# Patient Record
Sex: FEMALE | Race: OTHER | HISPANIC OR LATINO | Employment: FULL TIME | URBAN - METROPOLITAN AREA
[De-identification: names, ages, dates, MRNs, and addresses within clinical notes are randomized per-mention and may not be internally consistent; named-entity substitution may affect disease eponyms.]

---

## 2018-03-09 ENCOUNTER — OFFICE VISIT (OUTPATIENT)
Dept: INTERNAL MEDICINE | Facility: CLINIC | Age: 48
End: 2018-03-09
Payer: COMMERCIAL

## 2018-03-09 ENCOUNTER — APPOINTMENT (OUTPATIENT)
Dept: RADIOLOGY | Facility: HOSPITAL | Age: 48
End: 2018-03-09
Attending: FAMILY MEDICINE
Payer: COMMERCIAL

## 2018-03-09 VITALS
TEMPERATURE: 99 F | WEIGHT: 125.56 LBS | BODY MASS INDEX: 21.55 KG/M2 | OXYGEN SATURATION: 98 % | DIASTOLIC BLOOD PRESSURE: 80 MMHG | HEART RATE: 77 BPM | SYSTOLIC BLOOD PRESSURE: 113 MMHG

## 2018-03-09 DIAGNOSIS — R19.7 DIARRHEA, UNSPECIFIED TYPE: ICD-10-CM

## 2018-03-09 DIAGNOSIS — R10.84 GENERALIZED ABDOMINAL PAIN: Primary | ICD-10-CM

## 2018-03-09 DIAGNOSIS — R11.2 NAUSEA AND VOMITING, INTRACTABILITY OF VOMITING NOT SPECIFIED, UNSPECIFIED VOMITING TYPE: ICD-10-CM

## 2018-03-09 DIAGNOSIS — R31.9 HEMATURIA, UNSPECIFIED TYPE: ICD-10-CM

## 2018-03-09 DIAGNOSIS — R10.84 GENERALIZED ABDOMINAL PAIN: ICD-10-CM

## 2018-03-09 DIAGNOSIS — K92.1 BLOOD IN STOOL: ICD-10-CM

## 2018-03-09 DIAGNOSIS — R63.4 UNINTENTIONAL WEIGHT LOSS: ICD-10-CM

## 2018-03-09 DIAGNOSIS — E87.5 HYPERKALEMIA: ICD-10-CM

## 2018-03-09 LAB
ALBUMIN SERPL BCP-MCNC: 4.5 G/DL
ALP SERPL-CCNC: 44 U/L
ALT SERPL W/O P-5'-P-CCNC: 11 U/L
AMYLASE SERPL-CCNC: 61 U/L
ANION GAP SERPL CALC-SCNC: 8 MMOL/L
AST SERPL-CCNC: 17 U/L
BASOPHILS # BLD AUTO: 0.04 K/UL
BASOPHILS NFR BLD: 0.4 %
BILIRUB SERPL-MCNC: 0.4 MG/DL
BILIRUB SERPL-MCNC: ABNORMAL MG/DL
BILIRUB UR QL STRIP: NEGATIVE
BLOOD URINE, POC: 250
BUN SERPL-MCNC: 13 MG/DL
CALCIUM SERPL-MCNC: 10.4 MG/DL
CHLORIDE SERPL-SCNC: 107 MMOL/L
CLARITY UR REFRACT.AUTO: ABNORMAL
CO2 SERPL-SCNC: 26 MMOL/L
COLOR UR AUTO: YELLOW
COLOR, POC UA: ABNORMAL
CREAT SERPL-MCNC: 1 MG/DL
CRP SERPL-MCNC: <0.1 MG/L
DIFFERENTIAL METHOD: ABNORMAL
EOSINOPHIL # BLD AUTO: 0.1 K/UL
EOSINOPHIL NFR BLD: 0.5 %
ERYTHROCYTE [DISTWIDTH] IN BLOOD BY AUTOMATED COUNT: 13.4 %
EST. GFR  (AFRICAN AMERICAN): >60 ML/MIN/1.73 M^2
EST. GFR  (NON AFRICAN AMERICAN): >60 ML/MIN/1.73 M^2
ESTIMATED AVG GLUCOSE: 94 MG/DL
GLUCOSE SERPL-MCNC: 104 MG/DL
GLUCOSE UR QL STRIP: ABNORMAL
GLUCOSE UR QL STRIP: NEGATIVE
HBA1C MFR BLD HPLC: 4.9 %
HCT VFR BLD AUTO: 40.9 %
HGB BLD-MCNC: 13.3 G/DL
HGB UR QL STRIP: ABNORMAL
IMM GRANULOCYTES # BLD AUTO: 0.04 K/UL
IMM GRANULOCYTES NFR BLD AUTO: 0.4 %
KETONES UR QL STRIP: ABNORMAL
KETONES UR QL STRIP: NEGATIVE
LEUKOCYTE ESTERASE UR QL STRIP: NEGATIVE
LEUKOCYTE ESTERASE URINE, POC: ABNORMAL
LIPASE SERPL-CCNC: 72 U/L
LYMPHOCYTES # BLD AUTO: 2.5 K/UL
LYMPHOCYTES NFR BLD: 22.3 %
MCH RBC QN AUTO: 31.4 PG
MCHC RBC AUTO-ENTMCNC: 32.5 G/DL
MCV RBC AUTO: 97 FL
MICROSCOPIC COMMENT: ABNORMAL
MONOCYTES # BLD AUTO: 0.7 K/UL
MONOCYTES NFR BLD: 6.3 %
NEUTROPHILS # BLD AUTO: 7.9 K/UL
NEUTROPHILS NFR BLD: 70.1 %
NITRITE UR QL STRIP: NEGATIVE
NITRITE, POC UA: ABNORMAL
NRBC BLD-RTO: 0 /100 WBC
PH UR STRIP: 6 [PH] (ref 5–8)
PH, POC UA: 6
PLATELET # BLD AUTO: 297 K/UL
PMV BLD AUTO: 11 FL
POTASSIUM SERPL-SCNC: 5.8 MMOL/L
PROT SERPL-MCNC: 7.5 G/DL
PROT UR QL STRIP: NEGATIVE
PROTEIN, POC: ABNORMAL
RBC # BLD AUTO: 4.23 M/UL
RBC #/AREA URNS AUTO: >100 /HPF (ref 0–4)
SODIUM SERPL-SCNC: 141 MMOL/L
SP GR UR STRIP: 1.02 (ref 1–1.03)
SPECIFIC GRAVITY, POC UA: 1.01
SQUAMOUS #/AREA URNS AUTO: 5 /HPF
URN SPEC COLLECT METH UR: ABNORMAL
UROBILINOGEN UR STRIP-ACNC: 2 EU/DL
UROBILINOGEN, POC UA: ABNORMAL
WBC # BLD AUTO: 11.26 K/UL
WBC #/AREA URNS AUTO: 2 /HPF (ref 0–5)

## 2018-03-09 PROCEDURE — 80053 COMPREHEN METABOLIC PANEL: CPT

## 2018-03-09 PROCEDURE — 99214 OFFICE O/P EST MOD 30 MIN: CPT | Mod: 25,S$GLB,, | Performed by: FAMILY MEDICINE

## 2018-03-09 PROCEDURE — 83690 ASSAY OF LIPASE: CPT

## 2018-03-09 PROCEDURE — 81001 URINALYSIS AUTO W/SCOPE: CPT

## 2018-03-09 PROCEDURE — 86140 C-REACTIVE PROTEIN: CPT

## 2018-03-09 PROCEDURE — 99999 PR PBB SHADOW E&M-EST. PATIENT-LVL III: CPT | Mod: PBBFAC,,, | Performed by: FAMILY MEDICINE

## 2018-03-09 PROCEDURE — 87086 URINE CULTURE/COLONY COUNT: CPT

## 2018-03-09 PROCEDURE — 82150 ASSAY OF AMYLASE: CPT

## 2018-03-09 PROCEDURE — 85025 COMPLETE CBC W/AUTO DIFF WBC: CPT

## 2018-03-09 PROCEDURE — 83036 HEMOGLOBIN GLYCOSYLATED A1C: CPT

## 2018-03-09 PROCEDURE — 81002 URINALYSIS NONAUTO W/O SCOPE: CPT | Mod: S$GLB,,, | Performed by: FAMILY MEDICINE

## 2018-03-09 PROCEDURE — 74019 RADEX ABDOMEN 2 VIEWS: CPT | Mod: TC,FY,PO

## 2018-03-09 PROCEDURE — 74019 RADEX ABDOMEN 2 VIEWS: CPT | Mod: 26,,, | Performed by: RADIOLOGY

## 2018-03-09 RX ORDER — DICYCLOMINE HYDROCHLORIDE 10 MG/1
10 CAPSULE ORAL
Qty: 120 CAPSULE | Refills: 0 | Status: SHIPPED | OUTPATIENT
Start: 2018-03-09 | End: 2018-04-08

## 2018-03-09 NOTE — PROGRESS NOTES
Subjective:       Patient ID: Sabrina Collins is a 47 y.o. female.    Chief Complaint: Diarrhea; Constipation; and pt states pain in abdominal area    HPI  Constipation and diarrhea onset 3 weeks ago. Diarrhea at night, sometimes wakes up at night. Constipation during the day. Has tried immodium, drinking more and less water.  Tried changing food types. Reports mucous and orangey-red blood in stool and diarrhea. Recent weight loss of 10# in 2-3 weeks. Off and on since 20s, previously thought to be IBS but never completed tests. Has tried bentyl in past with improvement.     Emergency appendectomy, 12y ago    Denies recent travel. Continues to smoke    Fever 103, Monday. H CRC. Last cscope was last year with tortuous colon.  Review of Systems   Constitutional: Positive for fever and unexpected weight change.   Gastrointestinal: Positive for abdominal pain, blood in stool, constipation, diarrhea and vomiting.   Genitourinary: Positive for hematuria. Negative for dysuria, frequency and urgency.        Objective:   /80   Pulse 77   Temp 98.7 °F (37.1 °C)   Wt 56.9 kg (125 lb 8.8 oz)   SpO2 98%   BMI 21.55 kg/m²     Physical Exam   Constitutional: She is oriented to person, place, and time. She appears well-nourished. No distress.   HENT:   Head: Normocephalic and atraumatic.   Mouth/Throat: Oropharynx is clear and moist.   Eyes: Conjunctivae and EOM are normal. No scleral icterus.   Neck: Normal range of motion. Neck supple.   Cardiovascular: Normal rate, regular rhythm and normal heart sounds.    Pulmonary/Chest: Effort normal and breath sounds normal. She has no wheezes.   Abdominal: Soft. She exhibits no distension. Bowel sounds are increased. There is tenderness in the right lower quadrant and left lower quadrant. There is no rigidity, no rebound, no guarding and negative Vaz's sign.   Tympanic to percussion on RUQ and RLQ, dullness to percussion in LUQ and LLQ   Musculoskeletal: She exhibits no  edema or deformity.   Neurological: She is alert and oriented to person, place, and time.   Skin: Skin is warm and dry.   Psychiatric: She has a normal mood and affect. Her behavior is normal.   Vitals reviewed.    Assessment:     1. Generalized abdominal pain    2. Nausea and vomiting, intractability of vomiting not specified, unspecified vomiting type    3. Diarrhea, unspecified type    4. Unintentional weight loss    5. Blood in stool      Plan:     Problem List Items Addressed This Visit        Endocrine    Unintentional weight loss    Current Assessment & Plan     Worrisome signs of fever, unintended weight loss, blood in stool, FMH CRC (last cscope one year ago-neg). Will evaluate for infectious cause and electrolyte abnormalities. ED precautions. Concerns for nutrition. Provided FODMAP diet.         Relevant Orders    CT Abdomen With Without Contrast    Hemoglobin A1c       GI    Generalized abdominal pain - Primary    Relevant Medications    dicyclomine (BENTYL) 10 MG capsule    Other Relevant Orders    POCT urine dipstick without microscope (Completed)    Urinalysis    Urine culture    X-Ray Abdomen Flat And Erect    Amylase    Lipase    CT Abdomen With Without Contrast    C-reactive protein    Nausea and vomiting    Relevant Orders    Comprehensive metabolic panel    Diarrhea    Relevant Orders    CBC auto differential    C-reactive protein    Calprotectin    Blood in stool    Relevant Orders    Occult blood x 1, stool          Follow-up in about 3 days (around 3/12/2018), or if symptoms worsen or fail to improve.

## 2018-03-09 NOTE — ASSESSMENT & PLAN NOTE
Worrisome signs of fever, unintended weight loss, blood in stool, FMH CRC (last cscope one year ago-neg). Will evaluate for infectious cause and electrolyte abnormalities. ED precautions. Concerns for nutrition. Provided FODMAP diet.

## 2018-03-09 NOTE — PATIENT INSTRUCTIONS
Irritable Bowel Syndrome    Irritable bowel syndrome (IBS) is a disorder of the intestines. It is not a disease, but a group of symptoms caused by changes in the way the intestines work. It is fairly common, but the cause is not well understood.  Symptoms of IBS include:  · Abdominal pain, discomfort, and cramping  · Diarrhea  · Constipation or dry, hard stools  · Mucous stool  · Bloating  · Feeling of incomplete bowel movements  It usually results in one of 3 patterns of symptoms:  · Chronic abdominal pain and constipation  · Recurring episodes of diarrhea, with or without pain  · Alternating diarrhea and constipation  Home care  The goal of treatment is to control and relieve your symptoms, so you can lead a full and active life. There is no cure for IBS. But it can be managed.  Diet  Your diet did not cause your IBS, but it can affect it. No one diet works for everyone. Finding the best foods for you may take trial and error. Keep a food log to help find what foods made your symptoms worse. Below are some tips that may help you.  · Eat more slowly. Eat smaller amounts at a time, but more often. Remember, you can always eat more, but cannot eat less once youve eaten too much.  · High-fiber foods are complicated. While they may help relieve constipation, they can make your bloating, cramping, gas, and diarrhea worse.  · Eat less sugar.  · Try cutting out dairy products. Sometimes this helps.  · Try cutting out foods that are high in fat and fatty meats.  · You can control bloating or passing excess gas. Be careful with gassy vegetables and fruits like beans, cabbage, broccoli, and cauliflower.  · Be careful with carbonated beverages and fruit juices. They can make your bloating and diarrhea worse.  · Caffeine, alcohol, and stimulants can make symptoms worse. These include coffee, tea, sodas, energy drinks, and chocolate.  Lifestyle  · Look for factors that seem to worsen your symptoms. These include stress and  emotions.   · Although stress does not cause IBS, it may trigger flare-ups. Counseling can help you learn to handle stress. So can self-help measures like exercise, yoga, and meditation.  · Depression can occur along with IBS. Your healthcare provider may prescribe antidepressant medicine. This may help with diarrhea, constipation, and cramping, as well as with symptoms of depression.  · Smoking doesn't cause IBS, but can make the symptoms worse.  Medicines  Your healthcare provider may prescribe medicines. Take them as directed. For acute flare-ups of your illness, your provider may give you prescription medicines.  · Check with your healthcare provider before taking any medicines for diarrhea.  · Avoid anti-inflammatory medicines like ibuprofen or naproxen.  · Consider nutritional supplements. This is especially true if your diarrhea is prolonged, or you aren't eating or are losing weight  Follow-up care  Follow up with your healthcare provider, or as advised. If a stool sample was taken or cultures were done, you will be told if your treatment needs to change. You can call as directed for the results.  When to seek medical advice  Call your healthcare provider right away if any of these occur:  · Abdominal pain gets worse  · Constant abdominal pain moves to the right-lower abdomen  · You can't keep liquids down because of vomiting  · You have severe diarrhea  · You have blood (red or black color) or mucus in your stool  · You feel very weak or dizzy, faint, or have extreme thirst  · You have a fever of 100.4ºF (38.0ºC) or higher, or as directed by your healthcare provider  Date Last Reviewed: 8/31/2015  © 3126-0618 "[employer]. [address] [place] [address], [place] [address]. All rights reserved. This information is not intended as a substitute for professional medical care. Always follow your healthcare professional's instructions.

## 2018-03-11 LAB — BACTERIA UR CULT: NORMAL

## 2018-03-12 ENCOUNTER — TELEPHONE (OUTPATIENT)
Dept: INTERNAL MEDICINE | Facility: CLINIC | Age: 48
End: 2018-03-12

## 2018-03-12 DIAGNOSIS — E87.5 HYPERKALEMIA: Primary | ICD-10-CM

## 2018-03-12 NOTE — TELEPHONE ENCOUNTER
Patient informed of her needing to schedule her appt for her EKG, potassium and stool testing. Pt scheduled her appt for 3/13/18. Pt verbalized understanding of the information given.

## 2018-03-13 ENCOUNTER — TELEPHONE (OUTPATIENT)
Dept: INTERNAL MEDICINE | Facility: CLINIC | Age: 48
End: 2018-03-13

## 2018-03-13 ENCOUNTER — OFFICE VISIT (OUTPATIENT)
Dept: INTERNAL MEDICINE | Facility: CLINIC | Age: 48
End: 2018-03-13
Payer: COMMERCIAL

## 2018-03-13 ENCOUNTER — DOCUMENTATION ONLY (OUTPATIENT)
Dept: INTERNAL MEDICINE | Facility: CLINIC | Age: 48
End: 2018-03-13

## 2018-03-13 VITALS
HEART RATE: 72 BPM | SYSTOLIC BLOOD PRESSURE: 107 MMHG | OXYGEN SATURATION: 100 % | HEIGHT: 64 IN | WEIGHT: 125.88 LBS | BODY MASS INDEX: 21.49 KG/M2 | DIASTOLIC BLOOD PRESSURE: 69 MMHG | TEMPERATURE: 98 F

## 2018-03-13 DIAGNOSIS — R11.2 NAUSEA AND VOMITING, INTRACTABILITY OF VOMITING NOT SPECIFIED, UNSPECIFIED VOMITING TYPE: ICD-10-CM

## 2018-03-13 DIAGNOSIS — R19.7 DIARRHEA, UNSPECIFIED TYPE: ICD-10-CM

## 2018-03-13 DIAGNOSIS — R10.84 GENERALIZED ABDOMINAL PAIN: ICD-10-CM

## 2018-03-13 DIAGNOSIS — E87.5 HYPERKALEMIA: ICD-10-CM

## 2018-03-13 DIAGNOSIS — R74.8 ELEVATED LIPASE: ICD-10-CM

## 2018-03-13 DIAGNOSIS — K92.1 BLOOD IN STOOL: ICD-10-CM

## 2018-03-13 DIAGNOSIS — R31.9 HEMATURIA, UNSPECIFIED TYPE: Primary | ICD-10-CM

## 2018-03-13 LAB
ANION GAP SERPL CALC-SCNC: 10 MMOL/L
BACTERIA #/AREA URNS AUTO: ABNORMAL /HPF
BILIRUB UR QL STRIP: NEGATIVE
BUN SERPL-MCNC: 11 MG/DL
CALCIUM SERPL-MCNC: 9.4 MG/DL
CHLORIDE SERPL-SCNC: 108 MMOL/L
CLARITY UR REFRACT.AUTO: ABNORMAL
CO2 SERPL-SCNC: 22 MMOL/L
COLOR UR AUTO: YELLOW
CREAT SERPL-MCNC: 0.8 MG/DL
EST. GFR  (AFRICAN AMERICAN): >60 ML/MIN/1.73 M^2
EST. GFR  (NON AFRICAN AMERICAN): >60 ML/MIN/1.73 M^2
GLUCOSE SERPL-MCNC: 99 MG/DL
GLUCOSE UR QL STRIP: NEGATIVE
HGB UR QL STRIP: ABNORMAL
KETONES UR QL STRIP: NEGATIVE
LEUKOCYTE ESTERASE UR QL STRIP: NEGATIVE
LIPASE SERPL-CCNC: 30 U/L
MICROSCOPIC COMMENT: ABNORMAL
NITRITE UR QL STRIP: NEGATIVE
OB PNL STL: NEGATIVE
PH UR STRIP: 5 [PH] (ref 5–8)
POTASSIUM SERPL-SCNC: 4.3 MMOL/L
PROT UR QL STRIP: NEGATIVE
RBC #/AREA URNS AUTO: 32 /HPF (ref 0–4)
SODIUM SERPL-SCNC: 140 MMOL/L
SP GR UR STRIP: 1.01 (ref 1–1.03)
SQUAMOUS #/AREA URNS AUTO: 2 /HPF
TSH SERPL DL<=0.005 MIU/L-ACNC: 1.06 UIU/ML
URN SPEC COLLECT METH UR: ABNORMAL
UROBILINOGEN UR STRIP-ACNC: NEGATIVE EU/DL

## 2018-03-13 PROCEDURE — 80048 BASIC METABOLIC PNL TOTAL CA: CPT

## 2018-03-13 PROCEDURE — 83690 ASSAY OF LIPASE: CPT

## 2018-03-13 PROCEDURE — 84443 ASSAY THYROID STIM HORMONE: CPT

## 2018-03-13 PROCEDURE — 99999 PR PBB SHADOW E&M-EST. PATIENT-LVL III: CPT | Mod: PBBFAC,,, | Performed by: FAMILY MEDICINE

## 2018-03-13 PROCEDURE — 83993 ASSAY FOR CALPROTECTIN FECAL: CPT

## 2018-03-13 PROCEDURE — 81001 URINALYSIS AUTO W/SCOPE: CPT

## 2018-03-13 PROCEDURE — 99214 OFFICE O/P EST MOD 30 MIN: CPT | Mod: S$GLB,,, | Performed by: FAMILY MEDICINE

## 2018-03-13 PROCEDURE — 82272 OCCULT BLD FECES 1-3 TESTS: CPT

## 2018-03-13 PROCEDURE — 87086 URINE CULTURE/COLONY COUNT: CPT

## 2018-03-13 NOTE — LETTER
March 13, 2018      [address]  [place] [address]  Phone: [phone]  Fax: [phone]       Patient: Sabrina Collins   YOB: 1970  Date of Visit: 03/13/2018    To Whom It May Concern:    Martha Collins  was at [facility] on 03/13/2018. She may return to work tomorrow 03/14/2018. If you have any questions or concerns, or if I can be of further assistance, please do not hesitate to contact the office.    Sincerely,        BAR Moreira

## 2018-03-13 NOTE — PROGRESS NOTES
Subjective:       Patient ID: Sabrina Collins is a 47 y.o. female.    Chief Complaint: gi upset    Here for recheck - seen 3/9/18 for ab pain, bloody stool. Hx of last c-scope a year ago negative - long hx ab symptoms thought IBS in past. Recent wt loss, subjective fever, N/V/D/C. Reviewed recent labs - lipase mild elevation at 72, bloody urine. Otherwise fairly OK.  Lab Results       Component                Value               Date                       WBC                      11.26               03/09/2018                 HGB                      13.3                03/09/2018                 HCT                      40.9                03/09/2018                 PLT                      297                 03/09/2018                 CHOL                     224 (H)             03/30/2016                 TRIG                     191 (H)             03/30/2016                 HDL                      57                  03/30/2016                 LDLCALC                  128.8               03/30/2016                 ALT                      11                  03/09/2018                 AST                      17                  03/09/2018                 NA                       141                 03/09/2018                 K                        5.8 (H)             03/09/2018                 CL                       107                 03/09/2018                 CALCIUM                  10.4                03/09/2018                 CREATININE               1.0                 03/09/2018                 BUN                      13                  03/09/2018                 CO2                      26                  03/09/2018                 TSH                      1.386               10/28/2014                 GLU                      104                 03/09/2018                 ESTGFRAFRICA             >60.0               03/09/2018                 EGFRNONAA                >60.0                03/09/2018                 HGBA1C                   4.9                 03/09/2018            CT ab/pelvis was ordered but schedule is for early April.  She was given bentyl at her visit which has been helpful in past.    She states she is doing better - no pain today so far. Last diarrhea last evening. Able to sleep well last night. She had eaten rice dish last night.   Sees Dr Villarreal - was not having problems at the time of the colonoscopy last year. It was her second.   She does take probiotics - just one daily.   She will get cramps/diarrhea: but now mucus and blood this past couple months.  reviewd labs with pt. Blood in urine last May with gyn exam Dr De La Cruz. She sts she has persistent pain to mid back - dull achey not stabbing. Constant, including at night.       Review of Systems   Gastrointestinal: Positive for abdominal pain (cramp usually resolved or at minimum reduced with stooling.), blood in stool, constipation, diarrhea and vomiting.   Genitourinary: Positive for frequency, hematuria (discoloration/blood to urine) and urgency. Negative for difficulty urinating, dysuria and flank pain.       Objective:      Physical Exam   Constitutional: She is oriented to person, place, and time. She appears well-developed and well-nourished.   HENT:   Head: Normocephalic and atraumatic.   Right Ear: External ear normal.   Left Ear: External ear normal.   Mouth/Throat: Oropharynx is clear and moist. No oropharyngeal exudate.   Neck: Normal range of motion. Neck supple.   Cardiovascular: Normal rate, regular rhythm and normal heart sounds.    Pulmonary/Chest: Effort normal and breath sounds normal.   Abdominal: Soft. Bowel sounds are normal. She exhibits no distension and no mass. There is tenderness (mild diffuse TTP). There is no rebound and no guarding.   Neurological: She is alert and oriented to person, place, and time.   Skin: Skin is warm and dry.   Psychiatric: She has a normal mood and affect. Her behavior  is normal.         Assessment/Plan:     1. Hematuria, unspecified type  Urinalysis    Urine culture    Ambulatory referral to Urology   2. Hyperkalemia     3. Generalized abdominal pain     4. Nausea and vomiting, intractability of vomiting not specified, unspecified vomiting type     5. Diarrhea, unspecified type  TSH    Clostridium difficile EIA   6. Elevated lipase  Lipase   More than 50% of visit was spent in counseling regarding options, recommendations, medication use, side effects, expectations, and precautions.  Total time spent face-to-face was 25 minutes.  She will appoint with Dr Villarreal.  Needs her CT performed.  recs given re liquid diet and progression.

## 2018-03-13 NOTE — PATIENT INSTRUCTIONS
Continue liquids with introduction of no-fat, non-dairy items as discussed.  Make appt with Dr Villarreal.

## 2018-03-13 NOTE — PROGRESS NOTES
Elevated K w/ no hemolysis and blood in urine. Will need to leigh ann appt w/ her PCP or me for EKG, repeat K, UA, PO4 Tried to order but here today

## 2018-03-14 ENCOUNTER — TELEPHONE (OUTPATIENT)
Dept: INTERNAL MEDICINE | Facility: CLINIC | Age: 48
End: 2018-03-14

## 2018-03-14 DIAGNOSIS — R10.84 GENERALIZED ABDOMINAL PAIN: ICD-10-CM

## 2018-03-14 DIAGNOSIS — R31.9 HEMATURIA, UNSPECIFIED TYPE: Primary | ICD-10-CM

## 2018-03-14 LAB
BACTERIA UR CULT: NORMAL
BACTERIA UR CULT: NORMAL

## 2018-03-14 NOTE — TELEPHONE ENCOUNTER
"----- Message from [employer] sent at 3/13/2018  9:06 PM CDT -----  Regarding: Cancellation of Order # [id number]  Order number [id number] for the procedure CLOSTRIDIUM DIFFICILE   [YXA689] has been canceled by [employer] [[id number]].   This procedure was ordered by Laura Slaughter MD [[id number]] on   Mar 13, 2018 for the patient Sabrina Collins [[id number]]. The   reason for cancellation was "Other".  "

## 2018-03-15 NOTE — TELEPHONE ENCOUNTER
Her CT is not scheduled until 4/4/18. In the meantime, let's get renal US due to hematuria. Please schedule.    Also, can she get CT at earlier date?

## 2018-03-19 ENCOUNTER — TELEPHONE (OUTPATIENT)
Dept: INTERNAL MEDICINE | Facility: CLINIC | Age: 48
End: 2018-03-19

## 2018-03-19 NOTE — TELEPHONE ENCOUNTER
Aurelio from the lab states that the Clostridium Difficle test was unable to be preformed due to the stool sample being formed stool.    Please Advise

## 2018-03-19 NOTE — TELEPHONE ENCOUNTER
----- Message from Aurelio Bentley sent at 3/19/2018  7:43 AM CDT -----  Regarding: Lab Client Services  Contact: [phone]  Hi,           my name is Aurelio I work in the lab. We received a specimen for Clostridium Difficile test. The test was unable to be performed due to the stool sample was formed stool. If you have any questions regarding this please contact client services at [phone]. Thank You

## 2018-03-20 ENCOUNTER — TELEPHONE (OUTPATIENT)
Dept: INTERNAL MEDICINE | Facility: CLINIC | Age: 48
End: 2018-03-20

## 2018-03-21 LAB — CALPROTECTIN STL-MCNT: <15.6 MCG/G

## 2018-03-26 ENCOUNTER — TELEPHONE (OUTPATIENT)
Dept: INTERNAL MEDICINE | Facility: CLINIC | Age: 48
End: 2018-03-26

## 2018-03-26 DIAGNOSIS — R31.9 HEMATURIA, UNSPECIFIED TYPE: ICD-10-CM

## 2018-03-26 DIAGNOSIS — R10.84 GENERALIZED ABDOMINAL PAIN: ICD-10-CM

## 2018-03-26 DIAGNOSIS — R63.4 UNINTENTIONAL WEIGHT LOSS: Primary | ICD-10-CM

## 2018-03-27 NOTE — TELEPHONE ENCOUNTER
CT abdomen was approved, CT pelvis was not approved.  We will order a CT abdomen at this time.  New order entered.  Please schedule etc.

## 2018-04-03 ENCOUNTER — TELEPHONE (OUTPATIENT)
Dept: RADIOLOGY | Facility: HOSPITAL | Age: 48
End: 2018-04-03

## 2018-04-04 ENCOUNTER — HOSPITAL ENCOUNTER (OUTPATIENT)
Dept: RADIOLOGY | Facility: HOSPITAL | Age: 48
Discharge: HOME | End: 2018-04-04
Attending: FAMILY MEDICINE
Payer: COMMERCIAL

## 2018-04-04 DIAGNOSIS — R63.4 UNINTENTIONAL WEIGHT LOSS: ICD-10-CM

## 2018-04-04 DIAGNOSIS — R10.84 GENERALIZED ABDOMINAL PAIN: ICD-10-CM

## 2018-04-04 DIAGNOSIS — R31.9 HEMATURIA, UNSPECIFIED TYPE: ICD-10-CM

## 2018-04-04 PROCEDURE — 74177 CT ABD & PELVIS W/CONTRAST: CPT | Mod: TC,PO

## 2018-04-04 PROCEDURE — 25500020 PHARM REV CODE 255: Mod: PO | Performed by: FAMILY MEDICINE

## 2018-04-04 PROCEDURE — 74177 CT ABD & PELVIS W/CONTRAST: CPT | Mod: 26,,, | Performed by: RADIOLOGY

## 2018-04-04 RX ADMIN — IOHEXOL 30 ML: 350 INJECTION, SOLUTION INTRAVENOUS at 09:04

## 2018-04-04 RX ADMIN — IOHEXOL 75 ML: 350 INJECTION, SOLUTION INTRAVENOUS at 10:04

## 2018-04-12 ENCOUNTER — PATIENT OUTREACH (OUTPATIENT)
Dept: ADMINISTRATIVE | Facility: HOSPITAL | Age: 48
End: 2018-04-12

## 2018-04-20 ENCOUNTER — OFFICE VISIT (OUTPATIENT)
Dept: INTERNAL MEDICINE | Facility: CLINIC | Age: 48
End: 2018-04-20
Payer: COMMERCIAL

## 2018-04-20 VITALS
HEART RATE: 74 BPM | WEIGHT: 128.88 LBS | OXYGEN SATURATION: 100 % | TEMPERATURE: 98 F | BODY MASS INDEX: 22 KG/M2 | SYSTOLIC BLOOD PRESSURE: 136 MMHG | DIASTOLIC BLOOD PRESSURE: 83 MMHG | HEIGHT: 64 IN

## 2018-04-20 DIAGNOSIS — I70.0 ATHEROSCLEROSIS OF ABDOMINAL AORTA: ICD-10-CM

## 2018-04-20 DIAGNOSIS — R10.84 GENERALIZED ABDOMINAL PAIN: Primary | ICD-10-CM

## 2018-04-20 DIAGNOSIS — R31.9 HEMATURIA, UNSPECIFIED TYPE: ICD-10-CM

## 2018-04-20 DIAGNOSIS — F17.200 CURRENT EVERY DAY SMOKER: ICD-10-CM

## 2018-04-20 DIAGNOSIS — Z13.220 SCREENING FOR LIPID DISORDERS: ICD-10-CM

## 2018-04-20 PROCEDURE — 99213 OFFICE O/P EST LOW 20 MIN: CPT | Mod: S$GLB,,, | Performed by: FAMILY MEDICINE

## 2018-04-20 PROCEDURE — 99999 PR PBB SHADOW E&M-EST. PATIENT-LVL III: CPT | Mod: PBBFAC,,, | Performed by: FAMILY MEDICINE

## 2018-04-20 RX ORDER — HYOSCYAMINE SULFATE 0.12 MG/1
0.12 TABLET, ORALLY DISINTEGRATING ORAL EVERY 4 HOURS PRN
COMMUNITY

## 2018-04-20 RX ORDER — DICYCLOMINE HYDROCHLORIDE 10 MG/1
10 CAPSULE ORAL DAILY PRN
COMMUNITY

## 2018-04-20 RX ORDER — PRAVASTATIN SODIUM 40 MG/1
40 TABLET ORAL DAILY
Qty: 90 TABLET | Refills: 1 | Status: SHIPPED | OUTPATIENT
Start: 2018-04-20 | End: 2020-08-03

## 2018-04-20 NOTE — PROGRESS NOTES
Subjective:       Patient ID: Sabrina Collins is a 47 y.o. female.    Chief Complaint: Follow-up (CT scan)    She is here for f/u - reviewed her CT Ab/pelvis results with pt.  She has appointment scheduled with urology.  She sts her diarrhea for 3 months has finally resolved and now she is having problems with constipation which she notes occurs few days prior to menses and lasts through menses.   Upper ab pain on ongoing basis - she sees Dr Villarreal GI and is trying to get appt with him.   She has been gluten free for last month plus and sts this has helped.    Atherosclerosis aorta seen on CT - statin recommendation given.        Review of Systems   Gastrointestinal: Positive for abdominal pain, constipation and diarrhea.       Objective:      Physical Exam   Constitutional: She is oriented to person, place, and time. She appears well-developed and well-nourished.   HENT:   Head: Normocephalic and atraumatic.   Neurological: She is alert and oriented to person, place, and time.   Skin: Skin is warm and dry.   Psychiatric: She has a normal mood and affect. Her behavior is normal.         Assessment/Plan:     1. Generalized abdominal pain     2. Hematuria, unspecified type     3. Atherosclerosis of abdominal aorta  pravastatin (PRAVACHOL) 40 MG tablet   4. Screening for lipid disorders  Lipid panel   5. Current every day smoker  Ambulatory referral to Smoking Cessation Program    new start statin with pravastatin 40 mg.  Check lipids fasting prior to starting pravastatin.  Will place 6 month orders for lipids, AST, ALT when current study complete  She will follow-up with Dr. Villarreal for GI concerns.  She has Nulev and Bentyl for PRN use.  She has changed her diet and that seems to be helping.  Smoking cessation encouraged.  More than 50% of visit was spent in counseling regarding options, recommendations, medication use, side effects, expectations, and precautions.  Total time spent face-to-face was 30 minutes.

## 2018-05-02 ENCOUNTER — OFFICE VISIT (OUTPATIENT)
Dept: UROLOGY | Facility: CLINIC | Age: 48
End: 2018-05-02
Payer: COMMERCIAL

## 2018-05-02 VITALS
WEIGHT: 126.63 LBS | DIASTOLIC BLOOD PRESSURE: 72 MMHG | BODY MASS INDEX: 21.74 KG/M2 | SYSTOLIC BLOOD PRESSURE: 118 MMHG

## 2018-05-02 DIAGNOSIS — R31.9 HEMATURIA, UNSPECIFIED TYPE: Primary | ICD-10-CM

## 2018-05-02 LAB
BILIRUB SERPL-MCNC: NORMAL MG/DL
BLOOD URINE, POC: 250
COLOR, POC UA: YELLOW
GLUCOSE UR QL STRIP: NORMAL
KETONES UR QL STRIP: NORMAL
LEUKOCYTE ESTERASE URINE, POC: NORMAL
NITRITE, POC UA: NORMAL
PH, POC UA: 5
PROTEIN, POC: NORMAL
SPECIFIC GRAVITY, POC UA: 1.01
UROBILINOGEN, POC UA: NORMAL

## 2018-05-02 PROCEDURE — 99244 OFF/OP CNSLTJ NEW/EST MOD 40: CPT | Mod: 25,S$GLB,, | Performed by: UROLOGY

## 2018-05-02 PROCEDURE — 99999 PR PBB SHADOW E&M-EST. PATIENT-LVL II: CPT | Mod: PBBFAC,,, | Performed by: UROLOGY

## 2018-05-02 PROCEDURE — 81002 URINALYSIS NONAUTO W/O SCOPE: CPT | Mod: S$GLB,,, | Performed by: UROLOGY

## 2018-05-02 NOTE — PROGRESS NOTES
Chief Complaint: Hematuria    HPI:   5/2/18: 46 yo woman has periodic gross hematuria and a long history of microhematuria.  No unusual pain to speak of.  Chronic abd/pelvic pain and no exac/rel factors.  No urolithiasis.  No urinary bother.  No  history.  Normal sexual function.  CT w contrast shows no stones or other abnormalities. -UCx recently.  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  Referred by Dr. Slaughter.  Drinks 4 cups coffee a day.    Allergies:  Tetanus vaccines and toxoid    Medications: has a current medication list which includes the following prescription(s): cyclobenzaprine, dicyclomine, hyoscyamine, naproxen, pravastatin, and valacyclovir.    Review of Systems:  General: No fever, chills, fatigability, or weight loss.  Skin: No rashes, itching, or changes in color or texture of skin.  Chest: Denies FRANKLIN, cyanosis, wheezing, cough, and sputum production.  Abdomen: Appetite fine. No weight loss. Denies diarrhea, abdominal pain, hematemesis, or blood in stool.  Musculoskeletal: No joint stiffness or swelling. Denies back pain.  : As above.  All other review of systems negative.    PMH:   has a past medical history of HSV (herpes simplex virus) anogenital infection.    PSH:   has a past surgical history that includes Appendectomy; Tonsillectomy; Adenoidectomy; and Colonoscopy (07/21/2016).    FamHx: family history includes Cancer in her maternal grandfather, maternal grandmother, mother, paternal aunt, and paternal grandmother; Diabetes in her maternal grandmother; Heart disease in her maternal grandmother and paternal grandfather; Heart disease (age of onset: 35) in her mother.    SocHx:  reports that she has been smoking Cigarettes.  She has been smoking about 0.30 packs per day. She has never used smokeless tobacco. She reports that she does not drink alcohol or use drugs.     Physical Exam:  Vitals:   Vitals:    05/02/18 0715   BP: 118/72      General: A&Ox3. No apparent distress. No deformities.  Neck: No masses. Normal thyroid.  Lungs: normal inspiration. No use of accessory muscles.  Heart: normal pulse. No arrhythmias.  Abdomen: Soft. NT. ND. No masses. No hernias. No hepatosplenomegaly.  Lymphatic: Neck and groin nodes negative.  Skin: The skin is warm and dry. No jaundice.  Ext: No c/c/e.  : deferred    Labs/Studies:   Urinalysis performed in clinic, summary: UA normal exc 250 blood    Impression/Plan:   1. Will get CT Urogram and RTC cysto.  2. D/C Levsin runs counter to habits.

## 2018-05-02 NOTE — LETTER
May 2, 2018      Laura Slaughter MD  [address] [place] [address] [address] [place] [id number]           [facility]  [address] [place] [address]  [place] [address]  Phone: [phone]  Fax: [phone]          Patient: Sabrina Collins   MR Number: [id number]   YOB: 1970   Date of Visit: 5/2/2018       Dear Dr. Laura Slaughter:    Thank you for referring Sabrina Collins to me for evaluation. Attached you will find relevant portions of my assessment and plan of care.    If you have questions, please do not hesitate to call me. I look forward to following Sabrina Collins along with you.    Sincerely,    Ben Santos IV, MD    Enclosure  CC:  No Recipients    If you would like to receive this communication electronically, please contact [email or web address] or [phone] to request more information on [employer] access.    For providers and/or their staff who would like to refer a patient to [facility], please contact us through our one-stop-shop provider referral line, [facility], at [phone].    If you feel you have received this communication in error or would no longer like to receive these types of communications, please e-mail [email or web address]

## 2018-06-01 ENCOUNTER — TELEPHONE (OUTPATIENT)
Dept: RADIOLOGY | Facility: HOSPITAL | Age: 48
End: 2018-06-01

## 2018-06-04 ENCOUNTER — HOSPITAL ENCOUNTER (OUTPATIENT)
Dept: RADIOLOGY | Facility: HOSPITAL | Age: 48
Discharge: HOME | End: 2018-06-04
Attending: FAMILY MEDICINE
Payer: COMMERCIAL

## 2018-06-04 DIAGNOSIS — R10.84 GENERALIZED ABDOMINAL PAIN: ICD-10-CM

## 2018-06-04 DIAGNOSIS — R31.9 HEMATURIA, UNSPECIFIED TYPE: ICD-10-CM

## 2018-06-04 PROCEDURE — 76770 US EXAM ABDO BACK WALL COMP: CPT | Mod: 26,,, | Performed by: RADIOLOGY

## 2018-06-04 PROCEDURE — 76770 US EXAM ABDO BACK WALL COMP: CPT | Mod: TC,PO

## 2018-06-06 ENCOUNTER — PATIENT MESSAGE (OUTPATIENT)
Dept: UROLOGY | Facility: CLINIC | Age: 48
End: 2018-06-06

## 2018-06-11 ENCOUNTER — HOSPITAL ENCOUNTER (OUTPATIENT)
Dept: RADIOLOGY | Facility: HOSPITAL | Age: 48
Discharge: HOME | End: 2018-06-11
Attending: UROLOGY
Payer: COMMERCIAL

## 2018-06-11 DIAGNOSIS — R31.9 HEMATURIA, UNSPECIFIED TYPE: ICD-10-CM

## 2018-06-11 PROCEDURE — 25500020 PHARM REV CODE 255: Performed by: UROLOGY

## 2018-06-11 PROCEDURE — 74178 CT ABD&PLV WO CNTR FLWD CNTR: CPT | Mod: TC

## 2018-06-11 RX ADMIN — IOHEXOL 75 ML: 350 INJECTION, SOLUTION INTRAVENOUS at 10:06

## 2018-06-18 ENCOUNTER — OFFICE VISIT (OUTPATIENT)
Dept: UROLOGY | Facility: CLINIC | Age: 48
End: 2018-06-18
Payer: COMMERCIAL

## 2018-06-18 VITALS — WEIGHT: 126.75 LBS | HEIGHT: 68 IN | BODY MASS INDEX: 19.21 KG/M2

## 2018-06-18 DIAGNOSIS — R31.9 HEMATURIA, UNSPECIFIED TYPE: Primary | ICD-10-CM

## 2018-06-18 LAB
BILIRUB SERPL-MCNC: NORMAL MG/DL
BLOOD URINE, POC: 250
COLOR, POC UA: YELLOW
GLUCOSE UR QL STRIP: NORMAL
KETONES UR QL STRIP: NORMAL
LEUKOCYTE ESTERASE URINE, POC: NORMAL
NITRITE, POC UA: NORMAL
PH, POC UA: 7
PROTEIN, POC: NORMAL
SPECIFIC GRAVITY, POC UA: 1.01
UROBILINOGEN, POC UA: NORMAL

## 2018-06-18 PROCEDURE — 81002 URINALYSIS NONAUTO W/O SCOPE: CPT | Mod: S$GLB,,, | Performed by: UROLOGY

## 2018-06-18 PROCEDURE — 99499 UNLISTED E&M SERVICE: CPT | Mod: S$GLB,,, | Performed by: UROLOGY

## 2018-06-18 PROCEDURE — 96372 THER/PROPH/DIAG INJ SC/IM: CPT | Mod: 59,S$GLB,, | Performed by: UROLOGY

## 2018-06-18 PROCEDURE — 52000 CYSTOURETHROSCOPY: CPT | Mod: S$GLB,,, | Performed by: UROLOGY

## 2018-06-18 PROCEDURE — 99999 PR PBB SHADOW E&M-EST. PATIENT-LVL II: CPT | Mod: PBBFAC,,, | Performed by: UROLOGY

## 2018-06-18 RX ORDER — CEFTRIAXONE 1 G/1
1 INJECTION, POWDER, FOR SOLUTION INTRAMUSCULAR; INTRAVENOUS
Status: COMPLETED | OUTPATIENT
Start: 2018-06-18 | End: 2018-06-18

## 2018-06-18 RX ADMIN — CEFTRIAXONE 1 G: 1 INJECTION, POWDER, FOR SOLUTION INTRAMUSCULAR; INTRAVENOUS at 10:06

## 2018-06-18 NOTE — PROGRESS NOTES
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    5/2/18: 46 yo woman has periodic gross hematuria and a long history of microhematuria.  No unusual pain to speak of.  Chronic abd/pelvic pain and no exac/rel factors.  No urolithiasis.  No urinary bother.  No  history.  Normal sexual function.  CT w contrast shows no stones or other abnormalities. -UCx recently.  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  Referred by Dr. Slaughter.  Drinks 4 cups coffee a day.    Allergies:  Tetanus vaccines and toxoid    Medications: has a current medication list which includes the following prescription(s): cyclobenzaprine, dicyclomine, hyoscyamine, naproxen, pravastatin, and valacyclovir.    Review of Systems:  General: No fever, chills, fatigability, or weight loss.  Skin: No rashes, itching, or changes in color or texture of skin.  Chest: Denies FRANKLIN, cyanosis, wheezing, cough, and sputum production.  Abdomen: Appetite fine. No weight loss. Denies diarrhea, abdominal pain, hematemesis, or blood in stool.  Musculoskeletal: No joint stiffness or swelling. Denies back pain.  : As above.  All other review of systems negative.    PMH:   has a past medical history of HSV (herpes simplex virus) anogenital infection.    PSH:   has a past surgical history that includes Appendectomy; Tonsillectomy; Adenoidectomy; and Colonoscopy (07/21/2016).    FamHx: family history includes Cancer in her maternal grandfather, maternal grandmother, mother, paternal aunt, and paternal grandmother; Diabetes in her maternal grandmother; Heart disease in her maternal grandmother and paternal grandfather; Heart disease (age of onset: 35) in her mother.    SocHx:  reports that she has been smoking Cigarettes.  She has been smoking about 0.30 packs per day. She has never used smokeless tobacco. She reports that she does not drink alcohol or use drugs.      Physical Exam:  Vitals:   There were no vitals filed for this visit.  General: A&Ox3. No apparent distress. No deformities.  Neck: No masses. Normal thyroid.  Lungs: normal inspiration. No use of accessory muscles.  Heart: normal pulse. No arrhythmias.  Abdomen: Soft. NT. ND  Skin: The skin is warm and dry. No jaundice.  Ext: No c/c/e.  : deferred    Labs/Studies:   Urinalysis performed in clinic, summary: UA normal exc 250 blood    Procedure: Diagnostic Cystoscopy    Procedure in Detail: After proper consents were obtained, the patient was prepped and draped in normal sterile fashion for diagnostic cystoscopy. 5 ml of lidocaine jelly was instilled in the urethra. The flexible cystoscope was then introduced into the urethra, and advanced into the bladder under direct vision. The urethral mucosa appeared normal, and no strictures were noted. The sphincter appeared to be normal. The bladder neck was normal. Inspection of the interior of the bladder was then carried out. The trigone was unremarkable, with no mucosal lesions. The ureteral orifices were normal in position and configuration. Systematic inspection of the mucosa of the bladder it was then carried out, rotating the cystoscope so that all areas of the left and right lateral walls, the dome of the bladder, and the posterior wall were all visualized. The cystoscope was then advanced further into the bladder, and maximum deflection of the scope was performed so that the bladder neck could be inspected. No mucosal lesions were noted there. The cystoscope was then removed, and the procedure terminated.     Findings: normal cysto    Impression/Plan:   1. US/RTC 1 year.  PFMT.  Rocephin for Abx empiric.

## 2018-06-18 NOTE — PROGRESS NOTES
Per Dr. Santos IM rocephin given right ventrolateral using aseptic technique. Pt tolerated well. Pt instructed to remain in clinic for 15 minutes after injection to monitor for s/s of infection.

## 2019-01-04 ENCOUNTER — OFFICE VISIT (OUTPATIENT)
Dept: INTERNAL MEDICINE | Facility: CLINIC | Age: 49
End: 2019-01-04
Payer: COMMERCIAL

## 2019-01-04 VITALS
OXYGEN SATURATION: 99 % | WEIGHT: 131.06 LBS | SYSTOLIC BLOOD PRESSURE: 129 MMHG | DIASTOLIC BLOOD PRESSURE: 85 MMHG | HEART RATE: 64 BPM | HEIGHT: 68 IN | TEMPERATURE: 98 F | BODY MASS INDEX: 19.86 KG/M2

## 2019-01-04 DIAGNOSIS — F43.0 ANXIETY IN ACUTE STRESS REACTION: Primary | ICD-10-CM

## 2019-01-04 DIAGNOSIS — F41.1 ANXIETY IN ACUTE STRESS REACTION: Primary | ICD-10-CM

## 2019-01-04 DIAGNOSIS — M54.2 NECK PAIN, BILATERAL POSTERIOR: ICD-10-CM

## 2019-01-04 DIAGNOSIS — Z23 FLU VACCINE NEED: ICD-10-CM

## 2019-01-04 PROCEDURE — 99999 PR PBB SHADOW E&M-EST. PATIENT-LVL IV: CPT | Mod: PBBFAC,,, | Performed by: FAMILY MEDICINE

## 2019-01-04 PROCEDURE — 3008F PR BODY MASS INDEX (BMI) DOCUMENTED: ICD-10-PCS | Mod: CPTII,S$GLB,, | Performed by: FAMILY MEDICINE

## 2019-01-04 PROCEDURE — 99214 OFFICE O/P EST MOD 30 MIN: CPT | Mod: 25,S$GLB,, | Performed by: FAMILY MEDICINE

## 2019-01-04 PROCEDURE — 90686 FLU VACCINE (QUAD) GREATER THAN OR EQUAL TO 3YO PRESERVATIVE FREE IM: ICD-10-PCS | Mod: S$GLB,,, | Performed by: FAMILY MEDICINE

## 2019-01-04 PROCEDURE — 90471 FLU VACCINE (QUAD) GREATER THAN OR EQUAL TO 3YO PRESERVATIVE FREE IM: ICD-10-PCS | Mod: S$GLB,,, | Performed by: FAMILY MEDICINE

## 2019-01-04 PROCEDURE — 90471 IMMUNIZATION ADMIN: CPT | Mod: S$GLB,,, | Performed by: FAMILY MEDICINE

## 2019-01-04 PROCEDURE — 99999 PR PBB SHADOW E&M-EST. PATIENT-LVL IV: ICD-10-PCS | Mod: PBBFAC,,, | Performed by: FAMILY MEDICINE

## 2019-01-04 PROCEDURE — 90686 IIV4 VACC NO PRSV 0.5 ML IM: CPT | Mod: S$GLB,,, | Performed by: FAMILY MEDICINE

## 2019-01-04 PROCEDURE — 3008F BODY MASS INDEX DOCD: CPT | Mod: CPTII,S$GLB,, | Performed by: FAMILY MEDICINE

## 2019-01-04 PROCEDURE — 99214 PR OFFICE/OUTPT VISIT, EST, LEVL IV, 30-39 MIN: ICD-10-PCS | Mod: 25,S$GLB,, | Performed by: FAMILY MEDICINE

## 2019-01-04 RX ORDER — CYCLOBENZAPRINE HCL 10 MG
10 TABLET ORAL 3 TIMES DAILY PRN
Qty: 20 TABLET | Refills: 1 | Status: SHIPPED | OUTPATIENT
Start: 2019-01-04 | End: 2019-03-27 | Stop reason: SDUPTHER

## 2019-01-04 RX ORDER — ESCITALOPRAM OXALATE 10 MG/1
10 TABLET ORAL DAILY
Qty: 30 TABLET | Refills: 2 | Status: SHIPPED | OUTPATIENT
Start: 2019-01-04 | End: 2019-04-22 | Stop reason: SDUPTHER

## 2019-01-04 NOTE — PROGRESS NOTES
"Subjective:       Patient ID: Sabrina Collins is a 48 y.o. female.    Chief Complaint: Anxiety    She I s here with concerns about severe stress. Thinking her abdominal issues are related to anxiety, blistering/exzema as well. For the last year she is shaky, nauseated on awakening, sometimes bursts into tears for "no reason". Yesterday found herself delaying going in to work by washing her car in the rain. She in the past has normally been good to go. 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 No increased pay.    She did contact [facility] before Xmas holiday - saw two counselors and has couple more visits available.  Found when dealing with father's health crises over the holidays that although it was stressful she had none of the nausea, abdominal pain, tearfulness. Then as soon as she has RTW this week these all returned. She does have financial pressures, loves her profession, but not current situation. Finds current stressors intolerable. No SI.  Hx of depression and anxiety as teen - on SSRIs from age 12 through college and into first job. Recalls that prozac did not work well for her. One made her violent and aggressive - not know which.   Menses skipped three months this fall - sept - oct - nov. Had menses Dec.       Review of Systems   Constitutional: Positive for fatigue. Negative for appetite change.   Gastrointestinal: Positive for abdominal pain, diarrhea, nausea (smoothies qAM stay down ) and vomiting.   Musculoskeletal: Positive for back pain, myalgias and neck pain.   Neurological: Positive for headaches (occipital/tension HAs almost daily - uses stretches water naproxen).   Psychiatric/Behavioral: Positive for dysphoric mood and sleep disturbance (not sleeping onset and early awakening). The patient is nervous/anxious.        Objective:    "   Physical Exam   Constitutional: She is oriented to person, place, and time. She appears well-developed.   HENT:   Head: Normocephalic and atraumatic.   Cardiovascular: Normal rate, regular rhythm and normal heart sounds.   Pulmonary/Chest: Effort normal and breath sounds normal.   Neurological: She is alert and oriented to person, place, and time.   Skin: Skin is warm and dry.   Psychiatric: She has a normal mood and affect. Her behavior is normal.         Assessment/Plan:     1. Anxiety in acute stress reaction  escitalopram oxalate (LEXAPRO) 10 MG tablet    Ambulatory referral to Psychiatry   2. Neck pain, bilateral posterior  cyclobenzaprine (FLEXERIL) 10 MG tablet   3. Flu vaccine need  Influenza - Quadrivalent (3 years & older) (PF)   More than 50% of visit was spent in counseling regarding options, recommendations, medication use, side effects, expectations, and precautions.  Total time spent face-to-face was 35 minutes.  New start lexapro and recheck 5 weeks - see counselors for help with tools/choices

## 2019-01-04 NOTE — LETTER
January 4, 2019      [address]  [place] [address]  Phone: [phone]  Fax: [phone]       Patient: Sabrina Collins   YOB: 1970  Date of Visit: 01/04/2019    To Whom It May Concern:    Martha Collins  was at [facility] on 01/04/2019. She may return to work/school on 01/07/2019 with no restrictions. If you have any questions or concerns, or if I can be of further assistance, please do not hesitate to contact me.    Sincerely,    CRISTOFER Bourne LPN

## 2019-01-04 NOTE — PATIENT INSTRUCTIONS
Call the [facility] at [phone] for your appt with a counselor    Investigate other counselors and I will make another referral if needed.

## 2019-02-08 ENCOUNTER — OFFICE VISIT (OUTPATIENT)
Dept: INTERNAL MEDICINE | Facility: CLINIC | Age: 49
End: 2019-02-08
Payer: COMMERCIAL

## 2019-02-08 VITALS
HEART RATE: 73 BPM | SYSTOLIC BLOOD PRESSURE: 123 MMHG | BODY MASS INDEX: 19.25 KG/M2 | HEIGHT: 68 IN | TEMPERATURE: 97 F | OXYGEN SATURATION: 99 % | WEIGHT: 127 LBS | DIASTOLIC BLOOD PRESSURE: 86 MMHG

## 2019-02-08 DIAGNOSIS — F41.1 ANXIETY IN ACUTE STRESS REACTION: Primary | ICD-10-CM

## 2019-02-08 DIAGNOSIS — F43.0 ANXIETY IN ACUTE STRESS REACTION: Primary | ICD-10-CM

## 2019-02-08 DIAGNOSIS — L98.9 SKIN LESION: ICD-10-CM

## 2019-02-08 PROCEDURE — 3008F PR BODY MASS INDEX (BMI) DOCUMENTED: ICD-10-PCS | Mod: CPTII,S$GLB,, | Performed by: FAMILY MEDICINE

## 2019-02-08 PROCEDURE — 99213 OFFICE O/P EST LOW 20 MIN: CPT | Mod: S$GLB,,, | Performed by: FAMILY MEDICINE

## 2019-02-08 PROCEDURE — 3008F BODY MASS INDEX DOCD: CPT | Mod: CPTII,S$GLB,, | Performed by: FAMILY MEDICINE

## 2019-02-08 PROCEDURE — 99999 PR PBB SHADOW E&M-EST. PATIENT-LVL III: ICD-10-PCS | Mod: PBBFAC,,, | Performed by: FAMILY MEDICINE

## 2019-02-08 PROCEDURE — 99999 PR PBB SHADOW E&M-EST. PATIENT-LVL III: CPT | Mod: PBBFAC,,, | Performed by: FAMILY MEDICINE

## 2019-02-08 PROCEDURE — 99213 PR OFFICE/OUTPT VISIT, EST, LEVL III, 20-29 MIN: ICD-10-PCS | Mod: S$GLB,,, | Performed by: FAMILY MEDICINE

## 2019-02-08 NOTE — PROGRESS NOTES
Subjective:       Patient ID: Sabrina Collins is a 48 y.o. female.    Chief Complaint: Follow-up    This is a scheduled recheck for new start on Lexapro 10 mg 5 weeks ago.  She is experiencing stressful work situation leading to physical symptoms nausea/abdominal pain/skin rashes. She feels she has really benefited from the med. She still has heavy workload and not enough sleep. 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 Her neck pain is resolved now - she is still apt to have nausea and ab pain - not as severe or persistent - not having to get up at night to have diarrhea.      Review of Systems   Constitutional: Negative for appetite change.   Respiratory: Negative for shortness of breath.    Cardiovascular: Negative for chest pain and palpitations.   Gastrointestinal: Negative for constipation.   Musculoskeletal: Positive for arthralgias (shoulders, knees) and neck pain (still constant but decreased severity and increased mobility). Negative for back pain.   Psychiatric/Behavioral: Positive for sleep disturbance (sleep is improved. she still has difficulty falling asleep.). Decreased concentration: improved on med, but memory may not be quite perfect - does not feel significant problem.       Objective:      Physical Exam   Constitutional: She is oriented to person, place, and time. She appears well-developed.   HENT:   Head: Normocephalic and atraumatic.   Cardiovascular: Normal rate, regular rhythm and normal heart sounds.   Pulmonary/Chest: Effort normal and breath sounds normal.   Neurological: She is alert and oriented to person, place, and time.   Skin: Skin is warm and dry.   Psychiatric: She has a normal mood and affect. Her behavior is normal.         Assessment/Plan:     1. Anxiety in acute stress reaction     2. Skin lesion  Ambulatory referral to Dermatology   will continue on 10 mg lexapro. Maintain current dose level as it is effective. Re  counseling - never got a call back from [facility] counseling service, so has not had any counseling since last visit.  Will refill when needed - next recheck after school is out. PRN recheck as well.  She also has a skin lesion she wishes to have removed - it gets caught - she is advised to see derm.   More than 50% of visit was spent in counseling regarding options, recommendations, medication use, side effects, expectations, and precautions.  Total time spent face-to-face was 20 minutes.

## 2019-03-27 DIAGNOSIS — M54.2 NECK PAIN, BILATERAL POSTERIOR: ICD-10-CM

## 2019-03-27 RX ORDER — CYCLOBENZAPRINE HCL 10 MG
TABLET ORAL
Qty: 20 TABLET | Refills: 0 | Status: SHIPPED | OUTPATIENT
Start: 2019-03-27 | End: 2020-08-03

## 2019-04-22 DIAGNOSIS — F41.1 ANXIETY IN ACUTE STRESS REACTION: ICD-10-CM

## 2019-04-22 DIAGNOSIS — F43.0 ANXIETY IN ACUTE STRESS REACTION: ICD-10-CM

## 2019-04-22 RX ORDER — ESCITALOPRAM OXALATE 10 MG/1
TABLET ORAL
Qty: 30 TABLET | Refills: 5 | Status: SHIPPED | OUTPATIENT
Start: 2019-04-22 | End: 2020-08-03

## 2019-06-17 ENCOUNTER — TELEPHONE (OUTPATIENT)
Dept: RADIOLOGY | Facility: HOSPITAL | Age: 49
End: 2019-06-17

## 2019-06-18 ENCOUNTER — HOSPITAL ENCOUNTER (OUTPATIENT)
Dept: RADIOLOGY | Facility: HOSPITAL | Age: 49
Discharge: HOME | End: 2019-06-18
Attending: UROLOGY
Payer: COMMERCIAL

## 2019-06-18 DIAGNOSIS — R31.9 HEMATURIA, UNSPECIFIED TYPE: ICD-10-CM

## 2019-06-18 PROCEDURE — 76770 US EXAM ABDO BACK WALL COMP: CPT | Mod: 26,,, | Performed by: RADIOLOGY

## 2019-06-18 PROCEDURE — 76770 US RETROPERITONEAL COMPLETE: ICD-10-PCS | Mod: 26,,, | Performed by: RADIOLOGY

## 2019-06-18 PROCEDURE — 76770 US EXAM ABDO BACK WALL COMP: CPT | Mod: TC

## 2019-06-26 ENCOUNTER — OFFICE VISIT (OUTPATIENT)
Dept: UROLOGY | Facility: CLINIC | Age: 49
End: 2019-06-26
Payer: COMMERCIAL

## 2019-06-26 VITALS
SYSTOLIC BLOOD PRESSURE: 118 MMHG | DIASTOLIC BLOOD PRESSURE: 82 MMHG | WEIGHT: 127 LBS | HEART RATE: 74 BPM | HEIGHT: 68 IN | BODY MASS INDEX: 19.25 KG/M2

## 2019-06-26 DIAGNOSIS — R31.9 HEMATURIA, UNSPECIFIED TYPE: Primary | ICD-10-CM

## 2019-06-26 DIAGNOSIS — N32.81 OAB (OVERACTIVE BLADDER): ICD-10-CM

## 2019-06-26 LAB
BILIRUB SERPL-MCNC: NORMAL MG/DL
BLOOD URINE, POC: 250
COLOR, POC UA: YELLOW
GLUCOSE UR QL STRIP: NORMAL
KETONES UR QL STRIP: NORMAL
LEUKOCYTE ESTERASE URINE, POC: NORMAL
NITRITE, POC UA: NORMAL
PH, POC UA: 8
PROTEIN, POC: NORMAL
SPECIFIC GRAVITY, POC UA: 1
UROBILINOGEN, POC UA: NORMAL

## 2019-06-26 PROCEDURE — 99214 OFFICE O/P EST MOD 30 MIN: CPT | Mod: 25,S$GLB,, | Performed by: UROLOGY

## 2019-06-26 PROCEDURE — 99999 PR PBB SHADOW E&M-EST. PATIENT-LVL III: ICD-10-PCS | Mod: PBBFAC,,, | Performed by: UROLOGY

## 2019-06-26 PROCEDURE — 99999 PR PBB SHADOW E&M-EST. PATIENT-LVL III: CPT | Mod: PBBFAC,,, | Performed by: UROLOGY

## 2019-06-26 PROCEDURE — 99214 PR OFFICE/OUTPT VISIT, EST, LEVL IV, 30-39 MIN: ICD-10-PCS | Mod: 25,S$GLB,, | Performed by: UROLOGY

## 2019-06-26 PROCEDURE — 81002 POCT URINE DIPSTICK WITHOUT MICROSCOPE: ICD-10-PCS | Mod: S$GLB,,, | Performed by: UROLOGY

## 2019-06-26 PROCEDURE — 3008F PR BODY MASS INDEX (BMI) DOCUMENTED: ICD-10-PCS | Mod: CPTII,S$GLB,, | Performed by: UROLOGY

## 2019-06-26 PROCEDURE — 81002 URINALYSIS NONAUTO W/O SCOPE: CPT | Mod: S$GLB,,, | Performed by: UROLOGY

## 2019-06-26 PROCEDURE — 3008F BODY MASS INDEX DOCD: CPT | Mod: CPTII,S$GLB,, | Performed by: UROLOGY

## 2019-06-26 RX ORDER — CLOBETASOL PROPIONATE 0.5 MG/G
CREAM TOPICAL 2 TIMES DAILY
COMMUNITY

## 2019-06-26 RX ORDER — MUPIROCIN 20 MG/G
OINTMENT TOPICAL 3 TIMES DAILY
COMMUNITY
End: 2020-08-03 | Stop reason: SDUPTHER

## 2019-06-26 NOTE — PROGRESS NOTES
Chief Complaint: Hematuria    HPI:   6/26/19: Didn't get in for PFMT.  US reassuring no problems.  Caffeine reduction has helped symptoms some. Discussed indications of continued surveillance.  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  Cysto normal.  5/2/18: 48 yo woman has periodic gross hematuria and a long history of microhematuria.  No unusual pain to speak of.  Chronic abd/pelvic pain and no exac/rel factors.  No urolithiasis.  No urinary bother.  No  history.  Normal sexual function.  CT w contrast shows no stones or other abnormalities. -UCx recently.  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  Referred by Dr. Slaughter.  Drinks 4 cups coffee a day.    Allergies:  Tetanus vaccines and toxoid    Medications: has a current medication list which includes the following prescription(s): clobetasol, cyclobenzaprine, dicyclomine, escitalopram oxalate, hyoscyamine, mupirocin, naproxen, pravastatin, and valacyclovir.    Review of Systems:  General: No fever, chills, fatigability, or weight loss.  Skin: No rashes, itching, or changes in color or texture of skin.  Chest: Denies FRANKLIN, cyanosis, wheezing, cough, and sputum production.  Abdomen: Appetite fine. No weight loss. Denies diarrhea, abdominal pain, hematemesis, or blood in stool.  Musculoskeletal: No joint stiffness or swelling. Denies back pain.  : As above.  All other review of systems negative.    PMH:   has a past medical history of HSV (herpes simplex virus) anogenital infection.    PSH:   has a past surgical history that includes Appendectomy; Tonsillectomy; Adenoidectomy; and Colonoscopy (07/21/2016).    FamHx: family history includes Cancer in her maternal grandfather, maternal grandmother, mother, paternal aunt, and paternal grandmother; Diabetes in her maternal grandmother; Heart disease in her maternal grandmother and paternal grandfather; Heart disease (age of onset: 35) in her  mother.    SocHx:  reports that she has been smoking cigarettes.  She has been smoking about 0.30 packs per day. She has never used smokeless tobacco. She reports that she does not drink alcohol or use drugs.     Physical Exam:  Vitals:   Vitals:    06/26/19 1123   BP: 118/82   Pulse: 74     General: A&Ox3. No apparent distress. No deformities.  Neck: No masses. Normal thyroid.  Lungs: normal inspiration. No use of accessory muscles.  Heart: normal pulse. No arrhythmias.  Abdomen: Soft. NT. ND  Skin: The skin is warm and dry. No jaundice.  Ext: No c/c/e.  : deferred    Labs/Studies:   Urinalysis performed in clinic, summary: UA normal exc 250 blood    Impression/Plan:   1. US/RTC 1 year.  PFMT still desired.

## 2019-07-17 LAB
ALBUMIN/GLOB SERPL ELPH: 2.1 {RATIO}
ALBUMIN: 4.8
ALP ISOS SERPL LEV INH-CCNC: 53 U/L
ALT SERPL-CCNC: 15 U/L
AST SERPL-CCNC: 20 U/L
BILIRUBIN TOTAL: 0.4
BUN BLD-MCNC: 12 MG/DL (ref 4–21)
BUN/CREAT SERPL: 15
CALCIUM SERPL-MCNC: 9.7 MG/DL
CHLORIDE: 104
CHOLESTEROL, TOTAL: 266
CO2 SERPL-SCNC: 24 MMOL/L
CREATININE: 0.81
EGFR IF AFRICAN AMERICAN: 98
EST. GFR  (NON AFRICAN AMERICAN): 85 MG/DL
GLOBULIN SER CALC-MCNC: 2.3 G/L
GLUCOSE: 98
HDLC SERPL-MCNC: 79 MG/DL
LDLC SERPL CALC-MCNC: 162 MG/DL (ref 0–160)
POTASSIUM: 4.5
PROTEIN TOTAL: 7.1
SODIUM: 143
TRIGL SERPL-MCNC: 124 MG/DL
VLDLC SERPL-MCNC: 25 MG/DL

## 2019-07-30 ENCOUNTER — PATIENT OUTREACH (OUTPATIENT)
Dept: ADMINISTRATIVE | Facility: HOSPITAL | Age: 49
End: 2019-07-30

## 2019-08-07 ENCOUNTER — PATIENT OUTREACH (OUTPATIENT)
Dept: ADMINISTRATIVE | Facility: HOSPITAL | Age: 49
End: 2019-08-07

## 2019-08-08 NOTE — TELEPHONE ENCOUNTER
cT Scan of the Abdomen has been approved certification number is [id number]  The approval is valid March 9, 2018 to April 7, 2018  Please advise    Yes

## 2020-07-22 ENCOUNTER — PATIENT OUTREACH (OUTPATIENT)
Dept: ADMINISTRATIVE | Facility: OTHER | Age: 50
End: 2020-07-22

## 2020-07-22 DIAGNOSIS — Z12.31 BREAST CANCER SCREENING BY MAMMOGRAM: Primary | ICD-10-CM

## 2020-07-23 ENCOUNTER — OFFICE VISIT (OUTPATIENT)
Dept: UROLOGY | Facility: CLINIC | Age: 50
End: 2020-07-23
Payer: COMMERCIAL

## 2020-07-23 VITALS
BODY MASS INDEX: 23.6 KG/M2 | SYSTOLIC BLOOD PRESSURE: 122 MMHG | DIASTOLIC BLOOD PRESSURE: 80 MMHG | TEMPERATURE: 98 F | WEIGHT: 155.19 LBS

## 2020-07-23 DIAGNOSIS — R31.9 HEMATURIA, UNSPECIFIED TYPE: Primary | ICD-10-CM

## 2020-07-23 LAB
BILIRUB SERPL-MCNC: ABNORMAL MG/DL
BLOOD URINE, POC: ABNORMAL
CLARITY, POC UA: CLEAR
COLOR, POC UA: YELLOW
GLUCOSE UR QL STRIP: ABNORMAL
KETONES UR QL STRIP: ABNORMAL
LEUKOCYTE ESTERASE URINE, POC: ABNORMAL
NITRITE, POC UA: ABNORMAL
PH, POC UA: 7
PROTEIN, POC: ABNORMAL
SPECIFIC GRAVITY, POC UA: 1
UROBILINOGEN, POC UA: ABNORMAL

## 2020-07-23 PROCEDURE — 81002 URINALYSIS NONAUTO W/O SCOPE: CPT | Mod: S$GLB,,, | Performed by: UROLOGY

## 2020-07-23 PROCEDURE — 99999 PR PBB SHADOW E&M-EST. PATIENT-LVL III: ICD-10-PCS | Mod: PBBFAC,,, | Performed by: UROLOGY

## 2020-07-23 PROCEDURE — 3008F BODY MASS INDEX DOCD: CPT | Mod: CPTII,S$GLB,, | Performed by: UROLOGY

## 2020-07-23 PROCEDURE — 99999 PR PBB SHADOW E&M-EST. PATIENT-LVL III: CPT | Mod: PBBFAC,,, | Performed by: UROLOGY

## 2020-07-23 PROCEDURE — 3008F PR BODY MASS INDEX (BMI) DOCUMENTED: ICD-10-PCS | Mod: CPTII,S$GLB,, | Performed by: UROLOGY

## 2020-07-23 PROCEDURE — 99213 OFFICE O/P EST LOW 20 MIN: CPT | Mod: 25,S$GLB,, | Performed by: UROLOGY

## 2020-07-23 PROCEDURE — 81002 POCT URINE DIPSTICK WITHOUT MICROSCOPE: ICD-10-PCS | Mod: S$GLB,,, | Performed by: UROLOGY

## 2020-07-23 PROCEDURE — 99213 PR OFFICE/OUTPT VISIT, EST, LEVL III, 20-29 MIN: ICD-10-PCS | Mod: 25,S$GLB,, | Performed by: UROLOGY

## 2020-07-23 NOTE — PROGRESS NOTES
Chief Complaint: Hematuria    HPI:   7/23/20: Started the PFMT but insurance didn't cover.  Seemed to help some.  US not done yet.  No interval problems.  Double voiding seems to work.   6/26/19: Didn't get in for PFMT.  US reassuring no problems.  Caffeine reduction has helped symptoms some. Discussed indications of continued surveillance.  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  Cysto normal.  5/2/18: 48 yo woman has periodic gross hematuria and a long history of microhematuria.  No unusual pain to speak of.  Chronic abd/pelvic pain and no exac/rel factors.  No urolithiasis.  No urinary bother.  No  history.  Normal sexual function.  CT w contrast shows no stones or other abnormalities. -UCx recently.  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  Referred by Dr. Slaughter.  Drinks 4 cups coffee a day.    Allergies:  Tetanus vaccines and toxoid    Medications: has a current medication list which includes the following prescription(s): clobetasol, cyclobenzaprine, dicyclomine, hyoscyamine, mupirocin, naproxen, valacyclovir, escitalopram oxalate, and pravastatin.    Review of Systems:  General: No fever, chills, fatigability, or weight loss.  Skin: No rashes, itching, or changes in color or texture of skin.  Chest: Denies FRANKLIN, cyanosis, wheezing, cough, and sputum production.  Abdomen: Appetite fine. No weight loss. Denies diarrhea, abdominal pain, hematemesis, or blood in stool.  Musculoskeletal: No joint stiffness or swelling. Denies back pain.  : As above.  All other review of systems negative.    PMH:   has a past medical history of HSV (herpes simplex virus) anogenital infection.    PSH:   has a past surgical history that includes Appendectomy; Tonsillectomy; Adenoidectomy; and Colonoscopy (07/21/2016).    FamHx: family history includes Cancer in her maternal grandfather, maternal grandmother, mother, paternal aunt, and paternal grandmother;  Diabetes in her maternal grandmother; Heart disease in her maternal grandmother and paternal grandfather; Heart disease (age of onset: 35) in her mother.    SocHx:  reports that she has been smoking cigarettes. She has been smoking about 0.30 packs per day. She has never used smokeless tobacco. She reports that she does not drink alcohol or use drugs.     Physical Exam:  Vitals:   Vitals:    07/23/20 1600   BP: 122/80   Temp: 98.2 °F (36.8 °C)     General: A&Ox3. No apparent distress. No deformities.  Neck: No masses. Normal thyroid.  Lungs: normal inspiration. No use of accessory muscles.  Heart: normal pulse. No arrhythmias.  Abdomen: Soft. NT. ND  Skin: The skin is warm and dry. No jaundice.  Ext: No c/c/e.  : deferred    Labs/Studies:   Urinalysis performed in clinic, summary: UA normal exc 250 blood    Impression/Plan:   1. US now and US/RTC 1 year.  PFMT still desired.

## 2020-07-23 NOTE — PROGRESS NOTES
Chart reviewed.   Immunizations: Triggered Imm Registry     Orders placed: mammo w/rosy  Upcoming appts to satisfy SUSSY topics: n/a

## 2020-07-28 ENCOUNTER — PATIENT OUTREACH (OUTPATIENT)
Dept: ADMINISTRATIVE | Facility: HOSPITAL | Age: 50
End: 2020-07-28

## 2020-07-29 ENCOUNTER — TELEPHONE (OUTPATIENT)
Dept: RADIOLOGY | Facility: HOSPITAL | Age: 50
End: 2020-07-29

## 2020-07-30 ENCOUNTER — HOSPITAL ENCOUNTER (OUTPATIENT)
Dept: RADIOLOGY | Facility: HOSPITAL | Age: 50
Discharge: HOME | End: 2020-07-30
Attending: UROLOGY
Payer: COMMERCIAL

## 2020-07-30 DIAGNOSIS — R31.9 HEMATURIA, UNSPECIFIED TYPE: ICD-10-CM

## 2020-07-30 PROCEDURE — 76770 US EXAM ABDO BACK WALL COMP: CPT | Mod: TC

## 2020-07-30 PROCEDURE — 76770 US RETROPERITONEAL COMPLETE: ICD-10-PCS | Mod: 26,,, | Performed by: RADIOLOGY

## 2020-07-30 PROCEDURE — 76770 US EXAM ABDO BACK WALL COMP: CPT | Mod: 26,,, | Performed by: RADIOLOGY

## 2020-08-03 ENCOUNTER — OFFICE VISIT (OUTPATIENT)
Dept: INTERNAL MEDICINE | Facility: CLINIC | Age: 50
End: 2020-08-03
Payer: COMMERCIAL

## 2020-08-03 ENCOUNTER — HOSPITAL ENCOUNTER (OUTPATIENT)
Dept: RADIOLOGY | Facility: HOSPITAL | Age: 50
Discharge: HOME | End: 2020-08-03
Attending: FAMILY MEDICINE
Payer: COMMERCIAL

## 2020-08-03 VITALS
WEIGHT: 155.19 LBS | BODY MASS INDEX: 23.52 KG/M2 | DIASTOLIC BLOOD PRESSURE: 76 MMHG | TEMPERATURE: 99 F | OXYGEN SATURATION: 97 % | SYSTOLIC BLOOD PRESSURE: 124 MMHG | HEIGHT: 68 IN | HEART RATE: 88 BPM

## 2020-08-03 DIAGNOSIS — R07.9 CHEST PAIN, UNSPECIFIED TYPE: ICD-10-CM

## 2020-08-03 DIAGNOSIS — L98.9 SKIN LESION: ICD-10-CM

## 2020-08-03 DIAGNOSIS — Z23 IMMUNIZATION DUE: ICD-10-CM

## 2020-08-03 DIAGNOSIS — Z11.59 NEED FOR HEPATITIS C SCREENING TEST: ICD-10-CM

## 2020-08-03 DIAGNOSIS — A60.9 HSV (HERPES SIMPLEX VIRUS) ANOGENITAL INFECTION: ICD-10-CM

## 2020-08-03 DIAGNOSIS — J45.20 MILD INTERMITTENT ASTHMA WITHOUT COMPLICATION: ICD-10-CM

## 2020-08-03 DIAGNOSIS — Z00.00 WELLNESS EXAMINATION: Primary | ICD-10-CM

## 2020-08-03 DIAGNOSIS — Z11.4 SCREENING FOR HIV WITHOUT PRESENCE OF RISK FACTORS: ICD-10-CM

## 2020-08-03 PROCEDURE — 99999 PR PBB SHADOW E&M-EST. PATIENT-LVL IV: ICD-10-PCS | Mod: PBBFAC,,, | Performed by: FAMILY MEDICINE

## 2020-08-03 PROCEDURE — 90471 IMMUNIZATION ADMIN: CPT | Mod: S$GLB,,, | Performed by: FAMILY MEDICINE

## 2020-08-03 PROCEDURE — 90471 PNEUMOCOCCAL POLYSACCHARIDE VACCINE 23-VALENT =>2YO SQ IM: ICD-10-PCS | Mod: S$GLB,,, | Performed by: FAMILY MEDICINE

## 2020-08-03 PROCEDURE — 90732 PNEUMOCOCCAL POLYSACCHARIDE VACCINE 23-VALENT =>2YO SQ IM: ICD-10-PCS | Mod: S$GLB,,, | Performed by: FAMILY MEDICINE

## 2020-08-03 PROCEDURE — 3008F BODY MASS INDEX DOCD: CPT | Mod: CPTII,S$GLB,, | Performed by: FAMILY MEDICINE

## 2020-08-03 PROCEDURE — 90732 PPSV23 VACC 2 YRS+ SUBQ/IM: CPT | Mod: S$GLB,,, | Performed by: FAMILY MEDICINE

## 2020-08-03 PROCEDURE — 71046 X-RAY EXAM CHEST 2 VIEWS: CPT | Mod: 26,,, | Performed by: RADIOLOGY

## 2020-08-03 PROCEDURE — 3008F PR BODY MASS INDEX (BMI) DOCUMENTED: ICD-10-PCS | Mod: CPTII,S$GLB,, | Performed by: FAMILY MEDICINE

## 2020-08-03 PROCEDURE — 99396 PR PREVENTIVE VISIT,EST,40-64: ICD-10-PCS | Mod: 25,S$GLB,, | Performed by: FAMILY MEDICINE

## 2020-08-03 PROCEDURE — 99999 PR PBB SHADOW E&M-EST. PATIENT-LVL IV: CPT | Mod: PBBFAC,,, | Performed by: FAMILY MEDICINE

## 2020-08-03 PROCEDURE — 71046 X-RAY EXAM CHEST 2 VIEWS: CPT | Mod: TC

## 2020-08-03 PROCEDURE — 71046 XR CHEST PA AND LATERAL: ICD-10-PCS | Mod: 26,,, | Performed by: RADIOLOGY

## 2020-08-03 PROCEDURE — 99396 PREV VISIT EST AGE 40-64: CPT | Mod: 25,S$GLB,, | Performed by: FAMILY MEDICINE

## 2020-08-03 RX ORDER — VALACYCLOVIR HYDROCHLORIDE 1 G/1
1000 TABLET, FILM COATED ORAL DAILY
Qty: 30 TABLET | Refills: 0 | Status: SHIPPED | OUTPATIENT
Start: 2020-08-03

## 2020-08-03 RX ORDER — NAPROXEN SODIUM 220 MG
220 TABLET ORAL 2 TIMES DAILY PRN
COMMUNITY
End: 2022-03-07

## 2020-08-03 RX ORDER — ALBUTEROL SULFATE 90 UG/1
2 AEROSOL, METERED RESPIRATORY (INHALATION) EVERY 6 HOURS PRN
Qty: 18 G | Refills: 0 | Status: SHIPPED | OUTPATIENT
Start: 2020-08-03

## 2020-08-03 RX ORDER — MUPIROCIN 20 MG/G
OINTMENT TOPICAL 3 TIMES DAILY
Qty: 30 G | Refills: 0 | Status: SHIPPED | OUTPATIENT
Start: 2020-08-03

## 2020-08-03 RX ORDER — BUDESONIDE 0.5 MG/2ML
INHALANT ORAL
COMMUNITY
Start: 2020-07-17 | End: 2020-08-03 | Stop reason: CLARIF

## 2020-08-03 NOTE — PROGRESS NOTES
Subjective:       Patient ID: Sabrina Collins is a 50 y.o. female.    Chief Complaint: Annual Exam and paperwork    ANNUAL EXAM:  Preventative Health Checklist 24-64 years of age    Sabrina Collins presents today for an annual exam.  Her review of systems is significant for multiple items.  She has multiple    Hepatitis C Screening due on 1970 - order  HIV Screening due on 05/18/1984 - order  TETANUS VACCINE due on 05/18/1987 - allergy to tetanus toxoid  Pneumococcal Vaccine (Medium Risk)(1 of 1 - PPSV23) due on 05/18/1988 - today  Shingles Vaccine(1 of 2) due on 05/18/2019 - defer another year minimum - had shingles 3-4 yrs ago    Health Maintenance Summary                Hepatitis C Screening Overdue 1970     HIV Screening Overdue 5/18/1984     TETANUS VACCINE Overdue 5/18/1987     Pneumococcal Vaccine (Medium Risk) Overdue 5/18/1988     Shingles Vaccine Overdue 5/18/2019     Influenza Vaccine Next Due 9/1/2020      Done 1/4/2019 Imm Admin: Influenza - Quadrivalent - PF     Done 10/28/2014 Imm Admin: Influenza - Quadrivalent    Mammogram Next Due 7/24/2021      Done 7/24/2020 [facility] MAMMOGRAP[facility]     Done 7/17/2019 [facility] MAMMOGRAP[facility]     Done 7/13/2018 [employer]: WORKFLOW - HEALTHY PLANET - EXTERNAL DATA -   EXTERNAL PROCEDURES DATE - MAMMOGRAM     Done 7/18/2017 [facility] MAMMOGRAP[facility]     Done 7/10/2017 [employer]: WORKFLOW - HEALTHY PLANET - EXTERNAL DATA -   EXTERNAL PROCEDURES DATE - MAMMOGRAM     Patient has more history with this topic...    Cervical Cancer Screening Next Due 7/9/2022     Lipid Panel Next Due 7/17/2024      Done 7/17/2019 LIPID PANEL LDL Calculated           Done 3/30/2016 LIPID PANEL     Done 6/4/2015 [employer]: WORKFLOW - HEALTHY PLANET - EXTERNAL DATA -   EXTERNAL LAB DATE - LIPID PANEL     Done 10/28/2014 LIPID PANEL    Colorectal Cancer Screening Next Due 7/27/2026         Counseling:  Nutrition: Encouraged to take 5-10 servings fruits and vegetables daily   Exercise:  Physical activity  "recommendations reviewed and given hand out  Avoid Tobacco Use: reviewed - smoking 2-3 packs per week - about 1/2 ppd  Avoid ETOH/Drug Use:  reviewed  STD Prevention/Abstinence:   Avoid High Risk Behavior:   Unintended Pregnancy:    Lap-shoulder Belts:  Yes  No text/talk while driving: Reviewed  Bike/ATV Motorcycle Helmets:  N/A  Smoke Detector:  yes  Safe Storage/Removal of Firearms: reviewed    Calcium Intake (females):  Calcium recommendations given with handout  Regular Dental Visits:  yes  Floss, Brush and Fluoride: yes    She has completed a full 14 system review. All items are negative except as indicated.    Review of Systems   Constitutional: Positive for activity change (less) and unexpected weight change.   HENT: Positive for congestion, ear pain, hearing loss, postnasal drip, sore throat and trouble swallowing. Negative for rhinorrhea.    Eyes: Positive for visual disturbance. Negative for discharge.   Respiratory: Positive for chest tightness (substernal with breathing), shortness of breath (easier to get winded with activity) and wheezing (has asthma - using albuterol 1-2 week).    Cardiovascular: Positive for chest pain (see myalgia description) and palpitations.   Gastrointestinal: Positive for constipation and diarrhea. Negative for blood in stool and vomiting.   Endocrine: Negative.  Negative for polydipsia and polyuria.   Genitourinary: Positive for difficulty urinating and hematuria. Negative for dysuria and menstrual problem.   Musculoskeletal: Positive for arthralgias, joint swelling, myalgias ("pinchy sensation" beneath medial left clavicle us occurs at rest - lasts 1/2 day - feels sore - goes away on its own. occurring most days for last 4-5 months 4/10 at worst) and neck pain (on right just lateral to midline - comes and goes - but usually present).   Skin: Positive for rash.   Allergic/Immunologic: Positive for environmental allergies and food allergies.   Neurological: Positive for " headaches. Negative for weakness.   Hematological: Negative.    Psychiatric/Behavioral: Negative for confusion and dysphoric mood. The patient is nervous/anxious.        Objective:      Physical Exam  Constitutional:       Appearance: She is well-developed.   HENT:      Head: Normocephalic and atraumatic.      Right Ear: Tympanic membrane, ear canal and external ear normal.      Left Ear: Tympanic membrane, ear canal and external ear normal.      Nose: Nose normal.   Eyes:      Conjunctiva/sclera: Conjunctivae normal.   Neck:      Musculoskeletal: Normal range of motion and neck supple.      Thyroid: No thyromegaly.   Cardiovascular:      Rate and Rhythm: Normal rate and regular rhythm.      Heart sounds: Normal heart sounds. No murmur. No friction rub. No gallop.    Pulmonary:      Effort: Pulmonary effort is normal. No respiratory distress.      Breath sounds: Normal breath sounds. No wheezing, rhonchi or rales.      Comments: Upper left chest including superior to clavicle NTTP. No sternal TTP  Chest:      Chest wall: No tenderness.   Abdominal:      General: There is no distension.      Palpations: Abdomen is soft.      Tenderness: There is no abdominal tenderness.   Musculoskeletal:      Comments: FROM B shoulders - painless   Lymphadenopathy:      Cervical: No cervical adenopathy.   Skin:     General: Skin is warm and dry.   Neurological:      Mental Status: She is alert and oriented to person, place, and time.   Psychiatric:         Behavior: Behavior normal.           Assessment/Plan:     1. Wellness examination  HIV 1/2 Ag/Ab (4th Gen)    Hepatitis C Antibody   2. Need for hepatitis C screening test  Hepatitis C Antibody   3. Screening for HIV without presence of risk factors  HIV 1/2 Ag/Ab (4th Gen)   4. HSV (herpes simplex virus) anogenital infection  valACYclovir (VALTREX) 1000 MG tablet   5. Mild intermittent asthma without complication  albuterol (PROVENTIL/VENTOLIN HFA) 90 mcg/actuation inhaler   6.  Skin lesion  mupirocin (BACTROBAN) 2 % ointment   7. Immunization due  Pneumococcal Polysaccharide Vaccine (23 Valent) (SQ/IM)   8. Chest pain, unspecified type  X-Ray Chest PA And Lateral   uncertain etiology left upper chest pain - check CXR.  LINETTE for labs done Friday with gyn - she will send a copy when she gets it and we can add any labs needed to the HIV, HCV and she will come for lab at that time.  Form for temp telework filled out.

## 2020-08-28 ENCOUNTER — PATIENT OUTREACH (OUTPATIENT)
Dept: ADMINISTRATIVE | Facility: HOSPITAL | Age: 50
End: 2020-08-28

## 2020-08-31 NOTE — PROGRESS NOTES
Received most recent pap smear, lipid panel and other labs scanned into chart today and sent to LPN-CC.

## 2020-10-12 ENCOUNTER — PATIENT OUTREACH (OUTPATIENT)
Dept: ADMINISTRATIVE | Facility: HOSPITAL | Age: 50
End: 2020-10-12

## 2020-10-12 NOTE — PROGRESS NOTES
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   Per med card pt was taking statin. Is not taking any asa/antiplatelet therapy.  Pt is current smoker.  Pt has been on pravastatin, but appears to be off now per med card and is not taking any asa.  LM for to to contact office.

## 2020-10-19 ENCOUNTER — TELEPHONE (OUTPATIENT)
Dept: INTERNAL MEDICINE | Facility: CLINIC | Age: 50
End: 2020-10-19

## 2020-10-19 ENCOUNTER — PATIENT MESSAGE (OUTPATIENT)
Dept: INTERNAL MEDICINE | Facility: CLINIC | Age: 50
End: 2020-10-19

## 2020-10-19 ENCOUNTER — OFFICE VISIT (OUTPATIENT)
Dept: INTERNAL MEDICINE | Facility: CLINIC | Age: 50
End: 2020-10-19
Payer: COMMERCIAL

## 2020-10-19 VITALS — SYSTOLIC BLOOD PRESSURE: 124 MMHG | DIASTOLIC BLOOD PRESSURE: 76 MMHG

## 2020-10-19 DIAGNOSIS — Z20.822 CLOSE EXPOSURE TO COVID-19 VIRUS: Primary | ICD-10-CM

## 2020-10-19 DIAGNOSIS — J45.901 EXACERBATION OF ASTHMA, UNSPECIFIED ASTHMA SEVERITY, UNSPECIFIED WHETHER PERSISTENT: ICD-10-CM

## 2020-10-19 PROCEDURE — 99214 PR OFFICE/OUTPT VISIT, EST, LEVL IV, 30-39 MIN: ICD-10-PCS | Mod: 95,,, | Performed by: FAMILY MEDICINE

## 2020-10-19 PROCEDURE — 99214 OFFICE O/P EST MOD 30 MIN: CPT | Mod: 95,,, | Performed by: FAMILY MEDICINE

## 2020-10-19 NOTE — TELEPHONE ENCOUNTER
----- Message from Alberta De Leon sent at 10/19/2020  9:37 AM CDT -----  Regarding: same day/covid testing  Contact: pt  Type:  Same Day Appointment Request    Caller is requesting a same day appointment.  Caller declined first available appointment listed below.    Name of Caller:pt  When is the first available appointment?10/30/20  Symptoms:shortness of breath  Best Call Back Number:[phone] (home)   Additional Information: none

## 2020-10-19 NOTE — PROGRESS NOTES
Subjective:       Patient ID: Sabrina Collins is a 50 y.o. female.    Chief Complaint: No chief complaint on file.    Started Saturday  Using albuterol 2-3 times daily, helped minimally  States sx are less like asthmax  Exposed to covid on Wednesday for 7 h at work  Was sent home d/t sx    Shortness of Breath  This is a new problem. The current episode started in the past 7 days. The problem occurs daily. The problem has been gradually worsening. Associated symptoms include orthopnea, PND, a sore throat, sputum production and swollen glands. Pertinent negatives include no abdominal pain, chest pain, claudication, coryza, ear pain, fever, headaches, hemoptysis, leg pain, leg swelling, neck pain, rash, rhinorrhea, syncope, vomiting or wheezing. The symptoms are aggravated by exercise, URIs, any activity, fumes and pollens. Risk factors include smoking. She has tried beta agonist inhalers, body position changes and cool air for the symptoms. The treatment provided mild relief. Her past medical history is significant for allergies and asthma. There is no history of aspirin allergies, bronchiolitis, CAD, chronic lung disease, COPD, DVT, a heart failure, PE, pneumonia or a recent surgery.     Assessment:     1. Close exposure to COVID-19 virus    2. Exacerbation of asthma, unspecified asthma severity, unspecified whether persistent      Plan:     Problem List Items Addressed This Visit     None      Visit Diagnoses     Close exposure to COVID-19 virus    -  Primary    Relevant Orders    COVID-19 Home Symptom Monitoring  - Duration (days): 14 (Completed)    COVID-19 Routine Screening    Exacerbation of asthma, unspecified asthma severity, unspecified whether persistent        Relevant Medications    beclomethasone (QVAR) 40 mcg/actuation Aero          Review of Systems   Constitutional: Negative for fever.   HENT: Positive for sore throat. Negative for ear pain and rhinorrhea.    Respiratory: Positive for cough, sputum  production and shortness of breath. Negative for hemoptysis and wheezing.    Cardiovascular: Positive for orthopnea and PND. Negative for chest pain, claudication, leg swelling and syncope.   Gastrointestinal: Positive for nausea. Negative for abdominal pain, diarrhea (looser stool) and vomiting.   Musculoskeletal: Negative for neck pain.   Skin: Negative for rash.   Neurological: Positive for weakness. Negative for headaches.        Objective:   /76     BP Readings from Last 3 Encounters:   10/19/20 124/76   08/03/20 124/76   07/23/20 122/80       Lab Results   Component Value Date    HGBA1C 4.9 03/09/2018       Physical Exam  Vitals signs reviewed.   Constitutional:       General: She is not in acute distress.     Appearance: She is not ill-appearing or toxic-appearing.   HENT:      Head: Normocephalic and atraumatic.      Right Ear: Hearing normal.      Left Ear: Hearing normal.      Nose: Nose normal.      Mouth/Throat:      Lips: Pink.   Eyes:      General: Lids are normal.      Extraocular Movements: Extraocular movements intact.   Neck:      Musculoskeletal: Normal range of motion.   Pulmonary:      Effort: Pulmonary effort is normal. No respiratory distress.      Comments: Able to speak in full sentences without respiratory distress  Dry cough during exam  Neurological:      Mental Status: She is alert.   Psychiatric:         Speech: Speech normal.         Behavior: Behavior normal.         Thought Content: Thought content normal.         Judgment: Judgment normal.         Follow up if symptoms worsen or fail to improve.    The patient location is: home  The chief complaint leading to consultation is: sob    Visit type: audiovisual      3:15- minutes of total time spent on the encounter, which includes face to face time and non-face to face time preparing to see the patient (eg, review of tests), Obtaining and/or reviewing separately obtained history, Documenting clinical information in the electronic  or other health record, Independently interpreting results (not separately reported) and communicating results to the patient/family/caregiver, or Care coordination (not separately reported).         Each patient to whom he or she provides medical services by telemedicine is:  (1) informed of the relationship between the physician and patient and the respective role of any other health care provider with respect to management of the patient; and (2) notified that he or she may decline to receive medical services by telemedicine and may withdraw from such care at any time.    Notes:   Scheduled albuterol and steroid inhaler until symptoms improve  At risk d/t asthma  covid testing d/t exposure  Consider further work up/f2f exam if sx do not improve    New patient and new problem(s) to me

## 2020-10-20 ENCOUNTER — LAB VISIT (OUTPATIENT)
Dept: INTERNAL MEDICINE | Facility: CLINIC | Age: 50
End: 2020-10-20
Payer: COMMERCIAL

## 2020-10-20 DIAGNOSIS — Z20.822 CLOSE EXPOSURE TO COVID-19 VIRUS: ICD-10-CM

## 2020-10-20 LAB — SARS-COV-2 RNA RESP QL NAA+PROBE: NOT DETECTED

## 2020-10-20 PROCEDURE — U0003 INFECTIOUS AGENT DETECTION BY NUCLEIC ACID (DNA OR RNA); SEVERE ACUTE RESPIRATORY SYNDROME CORONAVIRUS 2 (SARS-COV-2) (CORONAVIRUS DISEASE [COVID-19]), AMPLIFIED PROBE TECHNIQUE, MAKING USE OF HIGH THROUGHPUT TECHNOLOGIES AS DESCRIBED BY CMS-2020-01-R: HCPCS

## 2020-12-08 ENCOUNTER — TELEPHONE (OUTPATIENT)
Dept: INTERNAL MEDICINE | Facility: CLINIC | Age: 50
End: 2020-12-08

## 2020-12-08 NOTE — TELEPHONE ENCOUNTER
----- Message from Marleni Serrato sent at 12/8/2020 10:49 AM CST -----  Contact: Pt  Pt called to schedule an appt for COVID exposed and asked for a call back.

## 2020-12-08 NOTE — TELEPHONE ENCOUNTER
----- Message from José Antonio Llanos sent at 12/8/2020  2:34 PM CST -----  Type:  Patient Returning Call    Who Called:Pt  Who Left Message for Patient:DIEGO TORO  Does the patient know what this is regarding?:  Would the patient rather a call back or a response via [employer]? Call   Best Call Back Number:[phone] (home)   Additional Information:

## 2021-03-02 ENCOUNTER — OFFICE VISIT (OUTPATIENT)
Dept: FAMILY MEDICINE | Facility: CLINIC | Age: 51
End: 2021-03-02
Payer: COMMERCIAL

## 2021-03-02 VITALS
BODY MASS INDEX: 25.96 KG/M2 | RESPIRATION RATE: 18 BRPM | TEMPERATURE: 97 F | DIASTOLIC BLOOD PRESSURE: 92 MMHG | WEIGHT: 171.31 LBS | HEIGHT: 68 IN | HEART RATE: 112 BPM | SYSTOLIC BLOOD PRESSURE: 150 MMHG | OXYGEN SATURATION: 98 %

## 2021-03-02 DIAGNOSIS — F43.9 STRESS: ICD-10-CM

## 2021-03-02 DIAGNOSIS — R03.0 BLOOD PRESSURE ELEVATED WITHOUT HISTORY OF HTN: ICD-10-CM

## 2021-03-02 DIAGNOSIS — G44.201 INTRACTABLE TENSION-TYPE HEADACHE, UNSPECIFIED CHRONICITY PATTERN: Primary | ICD-10-CM

## 2021-03-02 PROBLEM — R19.7 DIARRHEA: Status: RESOLVED | Noted: 2018-03-09 | Resolved: 2021-03-02

## 2021-03-02 PROBLEM — R10.84 GENERALIZED ABDOMINAL PAIN: Status: RESOLVED | Noted: 2018-03-09 | Resolved: 2021-03-02

## 2021-03-02 PROBLEM — R31.9 HEMATURIA: Status: RESOLVED | Noted: 2018-03-13 | Resolved: 2021-03-02

## 2021-03-02 PROBLEM — K92.1 BLOOD IN STOOL: Status: RESOLVED | Noted: 2018-03-09 | Resolved: 2021-03-02

## 2021-03-02 PROBLEM — R63.4 UNINTENTIONAL WEIGHT LOSS: Status: RESOLVED | Noted: 2018-03-09 | Resolved: 2021-03-02

## 2021-03-02 PROBLEM — R11.2 NAUSEA AND VOMITING: Status: RESOLVED | Noted: 2018-03-09 | Resolved: 2021-03-02

## 2021-03-02 PROCEDURE — 3008F PR BODY MASS INDEX (BMI) DOCUMENTED: ICD-10-PCS | Mod: CPTII,S$GLB,, | Performed by: REGISTERED NURSE

## 2021-03-02 PROCEDURE — 99999 PR PBB SHADOW E&M-EST. PATIENT-LVL IV: CPT | Mod: PBBFAC,,, | Performed by: REGISTERED NURSE

## 2021-03-02 PROCEDURE — 3008F BODY MASS INDEX DOCD: CPT | Mod: CPTII,S$GLB,, | Performed by: REGISTERED NURSE

## 2021-03-02 PROCEDURE — 1125F AMNT PAIN NOTED PAIN PRSNT: CPT | Mod: S$GLB,,, | Performed by: REGISTERED NURSE

## 2021-03-02 PROCEDURE — 99214 PR OFFICE/OUTPT VISIT, EST, LEVL IV, 30-39 MIN: ICD-10-PCS | Mod: 25,S$GLB,, | Performed by: REGISTERED NURSE

## 2021-03-02 PROCEDURE — 99999 PR PBB SHADOW E&M-EST. PATIENT-LVL IV: ICD-10-PCS | Mod: PBBFAC,,, | Performed by: REGISTERED NURSE

## 2021-03-02 PROCEDURE — 96372 THER/PROPH/DIAG INJ SC/IM: CPT | Mod: S$GLB,,, | Performed by: REGISTERED NURSE

## 2021-03-02 PROCEDURE — 96372 PR INJECTION,THERAP/PROPH/DIAG2ST, IM OR SUBCUT: ICD-10-PCS | Mod: S$GLB,,, | Performed by: REGISTERED NURSE

## 2021-03-02 PROCEDURE — 99214 OFFICE O/P EST MOD 30 MIN: CPT | Mod: 25,S$GLB,, | Performed by: REGISTERED NURSE

## 2021-03-02 PROCEDURE — 1125F PR PAIN SEVERITY QUANTIFIED, PAIN PRESENT: ICD-10-PCS | Mod: S$GLB,,, | Performed by: REGISTERED NURSE

## 2021-03-02 RX ORDER — ESCITALOPRAM OXALATE 10 MG/1
10 TABLET ORAL DAILY
COMMUNITY
End: 2021-03-02 | Stop reason: SDUPTHER

## 2021-03-02 RX ORDER — KETOROLAC TROMETHAMINE 30 MG/ML
60 INJECTION, SOLUTION INTRAMUSCULAR; INTRAVENOUS
Status: COMPLETED | OUTPATIENT
Start: 2021-03-02 | End: 2021-03-02

## 2021-03-02 RX ORDER — ESCITALOPRAM OXALATE 10 MG/1
10 TABLET ORAL DAILY
Qty: 30 TABLET | Refills: 6 | Status: SHIPPED | OUTPATIENT
Start: 2021-03-02 | End: 2021-10-14

## 2021-03-02 RX ORDER — BUTALBITAL, ACETAMINOPHEN AND CAFFEINE 50; 325; 40 MG/1; MG/1; MG/1
1 TABLET ORAL
Qty: 45 TABLET | Refills: 0 | Status: SHIPPED | OUTPATIENT
Start: 2021-03-02 | End: 2021-03-03 | Stop reason: SDUPTHER

## 2021-03-02 RX ORDER — METHOCARBAMOL 750 MG/1
750 TABLET, FILM COATED ORAL 2 TIMES DAILY PRN
Qty: 60 TABLET | Refills: 0 | Status: SHIPPED | OUTPATIENT
Start: 2021-03-02

## 2021-03-02 RX ADMIN — KETOROLAC TROMETHAMINE 60 MG: 30 INJECTION, SOLUTION INTRAMUSCULAR; INTRAVENOUS at 05:03

## 2021-03-03 DIAGNOSIS — G44.201 INTRACTABLE TENSION-TYPE HEADACHE, UNSPECIFIED CHRONICITY PATTERN: ICD-10-CM

## 2021-03-03 RX ORDER — BUTALBITAL, ACETAMINOPHEN AND CAFFEINE 50; 325; 40 MG/1; MG/1; MG/1
1 TABLET ORAL
Qty: 45 TABLET | Refills: 0 | Status: SHIPPED | OUTPATIENT
Start: 2021-03-03

## 2021-03-03 RX ORDER — BUTALBITAL, ACETAMINOPHEN AND CAFFEINE 50; 325; 40 MG/1; MG/1; MG/1
1 TABLET ORAL
Qty: 45 TABLET | Refills: 0 | Status: SHIPPED | OUTPATIENT
Start: 2021-03-03 | End: 2021-03-03 | Stop reason: SDUPTHER

## 2021-03-03 RX ORDER — BUTALBITAL, ACETAMINOPHEN AND CAFFEINE 50; 325; 40 MG/1; MG/1; MG/1
1 TABLET ORAL
Qty: 45 TABLET | Refills: 0 | Status: CANCELLED | OUTPATIENT
Start: 2021-03-03

## 2021-03-16 ENCOUNTER — CLINICAL SUPPORT (OUTPATIENT)
Dept: FAMILY MEDICINE | Facility: CLINIC | Age: 51
End: 2021-03-16
Payer: COMMERCIAL

## 2021-03-16 VITALS — SYSTOLIC BLOOD PRESSURE: 122 MMHG | TEMPERATURE: 98 F | DIASTOLIC BLOOD PRESSURE: 82 MMHG

## 2021-03-16 PROCEDURE — 99999 PR PBB SHADOW E&M-EST. PATIENT-LVL I: ICD-10-PCS | Mod: PBBFAC,,,

## 2021-03-16 PROCEDURE — 99999 PR PBB SHADOW E&M-EST. PATIENT-LVL I: CPT | Mod: PBBFAC,,,

## 2021-04-08 ENCOUNTER — TELEPHONE (OUTPATIENT)
Dept: INTERNAL MEDICINE | Facility: CLINIC | Age: 51
End: 2021-04-08

## 2021-04-23 ENCOUNTER — LAB VISIT (OUTPATIENT)
Dept: LAB | Facility: HOSPITAL | Age: 51
End: 2021-04-23
Attending: REGISTERED NURSE
Payer: COMMERCIAL

## 2021-04-23 ENCOUNTER — TELEPHONE (OUTPATIENT)
Dept: INTERNAL MEDICINE | Facility: CLINIC | Age: 51
End: 2021-04-23

## 2021-04-23 ENCOUNTER — OFFICE VISIT (OUTPATIENT)
Dept: FAMILY MEDICINE | Facility: CLINIC | Age: 51
End: 2021-04-23
Payer: COMMERCIAL

## 2021-04-23 VITALS
HEART RATE: 103 BPM | RESPIRATION RATE: 18 BRPM | SYSTOLIC BLOOD PRESSURE: 150 MMHG | TEMPERATURE: 99 F | WEIGHT: 167.56 LBS | BODY MASS INDEX: 25.39 KG/M2 | DIASTOLIC BLOOD PRESSURE: 70 MMHG | OXYGEN SATURATION: 100 % | HEIGHT: 68 IN

## 2021-04-23 DIAGNOSIS — I10 HYPERTENSION, UNSPECIFIED TYPE: ICD-10-CM

## 2021-04-23 DIAGNOSIS — I10 HYPERTENSION, UNSPECIFIED TYPE: Primary | ICD-10-CM

## 2021-04-23 LAB
BASOPHILS # BLD AUTO: 0.05 K/UL (ref 0–0.2)
BASOPHILS NFR BLD: 0.5 % (ref 0–1.9)
DIFFERENTIAL METHOD: ABNORMAL
EOSINOPHIL # BLD AUTO: 0.1 K/UL (ref 0–0.5)
EOSINOPHIL NFR BLD: 0.7 % (ref 0–8)
ERYTHROCYTE [DISTWIDTH] IN BLOOD BY AUTOMATED COUNT: 12.8 % (ref 11.5–14.5)
HCT VFR BLD AUTO: 39.2 % (ref 37–48.5)
HGB BLD-MCNC: 12.5 G/DL (ref 12–16)
IMM GRANULOCYTES # BLD AUTO: 0.04 K/UL (ref 0–0.04)
IMM GRANULOCYTES NFR BLD AUTO: 0.4 % (ref 0–0.5)
LYMPHOCYTES # BLD AUTO: 2.3 K/UL (ref 1–4.8)
LYMPHOCYTES NFR BLD: 23 % (ref 18–48)
MCH RBC QN AUTO: 31.3 PG (ref 27–31)
MCHC RBC AUTO-ENTMCNC: 31.9 G/DL (ref 32–36)
MCV RBC AUTO: 98 FL (ref 82–98)
MONOCYTES # BLD AUTO: 0.5 K/UL (ref 0.3–1)
MONOCYTES NFR BLD: 5.3 % (ref 4–15)
NEUTROPHILS # BLD AUTO: 6.9 K/UL (ref 1.8–7.7)
NEUTROPHILS NFR BLD: 70.1 % (ref 38–73)
NRBC BLD-RTO: 0 /100 WBC
PLATELET # BLD AUTO: 313 K/UL (ref 150–450)
PMV BLD AUTO: 10.7 FL (ref 9.2–12.9)
RBC # BLD AUTO: 4 M/UL (ref 4–5.4)
WBC # BLD AUTO: 9.86 K/UL (ref 3.9–12.7)

## 2021-04-23 PROCEDURE — 3077F SYST BP >= 140 MM HG: CPT | Mod: CPTII,S$GLB,, | Performed by: REGISTERED NURSE

## 2021-04-23 PROCEDURE — 85025 COMPLETE CBC W/AUTO DIFF WBC: CPT | Performed by: REGISTERED NURSE

## 2021-04-23 PROCEDURE — 3008F BODY MASS INDEX DOCD: CPT | Mod: CPTII,S$GLB,, | Performed by: REGISTERED NURSE

## 2021-04-23 PROCEDURE — 36415 COLL VENOUS BLD VENIPUNCTURE: CPT | Mod: PO | Performed by: REGISTERED NURSE

## 2021-04-23 PROCEDURE — 83735 ASSAY OF MAGNESIUM: CPT | Performed by: REGISTERED NURSE

## 2021-04-23 PROCEDURE — 1126F PR PAIN SEVERITY QUANTIFIED, NO PAIN PRESENT: ICD-10-PCS | Mod: S$GLB,,, | Performed by: REGISTERED NURSE

## 2021-04-23 PROCEDURE — 99214 OFFICE O/P EST MOD 30 MIN: CPT | Mod: S$GLB,,, | Performed by: REGISTERED NURSE

## 2021-04-23 PROCEDURE — 80053 COMPREHEN METABOLIC PANEL: CPT | Performed by: REGISTERED NURSE

## 2021-04-23 PROCEDURE — 3077F PR MOST RECENT SYSTOLIC BLOOD PRESSURE >= 140 MM HG: ICD-10-PCS | Mod: CPTII,S$GLB,, | Performed by: REGISTERED NURSE

## 2021-04-23 PROCEDURE — 3078F PR MOST RECENT DIASTOLIC BLOOD PRESSURE < 80 MM HG: ICD-10-PCS | Mod: CPTII,S$GLB,, | Performed by: REGISTERED NURSE

## 2021-04-23 PROCEDURE — 99999 PR PBB SHADOW E&M-EST. PATIENT-LVL IV: ICD-10-PCS | Mod: PBBFAC,,, | Performed by: REGISTERED NURSE

## 2021-04-23 PROCEDURE — 3078F DIAST BP <80 MM HG: CPT | Mod: CPTII,S$GLB,, | Performed by: REGISTERED NURSE

## 2021-04-23 PROCEDURE — 84443 ASSAY THYROID STIM HORMONE: CPT | Performed by: REGISTERED NURSE

## 2021-04-23 PROCEDURE — 1126F AMNT PAIN NOTED NONE PRSNT: CPT | Mod: S$GLB,,, | Performed by: REGISTERED NURSE

## 2021-04-23 PROCEDURE — 99999 PR PBB SHADOW E&M-EST. PATIENT-LVL IV: CPT | Mod: PBBFAC,,, | Performed by: REGISTERED NURSE

## 2021-04-23 PROCEDURE — 99214 PR OFFICE/OUTPT VISIT, EST, LEVL IV, 30-39 MIN: ICD-10-PCS | Mod: S$GLB,,, | Performed by: REGISTERED NURSE

## 2021-04-23 PROCEDURE — 3008F PR BODY MASS INDEX (BMI) DOCUMENTED: ICD-10-PCS | Mod: CPTII,S$GLB,, | Performed by: REGISTERED NURSE

## 2021-04-23 RX ORDER — METOPROLOL SUCCINATE 25 MG/1
25 TABLET, EXTENDED RELEASE ORAL DAILY
Qty: 30 TABLET | Refills: 6 | Status: SHIPPED | OUTPATIENT
Start: 2021-04-23 | End: 2021-07-21 | Stop reason: SDUPTHER

## 2021-04-23 RX ORDER — ACETAMINOPHEN 500 MG
1 TABLET ORAL DAILY
Qty: 1 EACH | Refills: 0 | Status: SHIPPED | OUTPATIENT
Start: 2021-04-23

## 2021-04-24 LAB
ALBUMIN SERPL BCP-MCNC: 4.4 G/DL (ref 3.5–5.2)
ALP SERPL-CCNC: 72 U/L (ref 55–135)
ALT SERPL W/O P-5'-P-CCNC: 15 U/L (ref 10–44)
ANION GAP SERPL CALC-SCNC: 11 MMOL/L (ref 8–16)
AST SERPL-CCNC: 21 U/L (ref 10–40)
BILIRUB SERPL-MCNC: 0.3 MG/DL (ref 0.1–1)
BUN SERPL-MCNC: 10 MG/DL (ref 6–20)
CALCIUM SERPL-MCNC: 9.7 MG/DL (ref 8.7–10.5)
CHLORIDE SERPL-SCNC: 107 MMOL/L (ref 95–110)
CO2 SERPL-SCNC: 26 MMOL/L (ref 23–29)
CREAT SERPL-MCNC: 0.9 MG/DL (ref 0.5–1.4)
EST. GFR  (AFRICAN AMERICAN): >60 ML/MIN/1.73 M^2
EST. GFR  (NON AFRICAN AMERICAN): >60 ML/MIN/1.73 M^2
GLUCOSE SERPL-MCNC: 85 MG/DL (ref 70–110)
MAGNESIUM SERPL-MCNC: 1.8 MG/DL (ref 1.6–2.6)
POTASSIUM SERPL-SCNC: 4.2 MMOL/L (ref 3.5–5.1)
PROT SERPL-MCNC: 7.6 G/DL (ref 6–8.4)
SODIUM SERPL-SCNC: 144 MMOL/L (ref 136–145)
TSH SERPL DL<=0.005 MIU/L-ACNC: 2.36 UIU/ML (ref 0.4–4)

## 2021-04-28 ENCOUNTER — PATIENT MESSAGE (OUTPATIENT)
Dept: RESEARCH | Facility: HOSPITAL | Age: 51
End: 2021-04-28

## 2021-05-06 ENCOUNTER — TELEPHONE (OUTPATIENT)
Dept: UROLOGY | Facility: CLINIC | Age: 51
End: 2021-05-06

## 2021-05-06 ENCOUNTER — PATIENT MESSAGE (OUTPATIENT)
Dept: UROLOGY | Facility: CLINIC | Age: 51
End: 2021-05-06

## 2021-05-18 ENCOUNTER — TELEPHONE (OUTPATIENT)
Dept: FAMILY MEDICINE | Facility: CLINIC | Age: 51
End: 2021-05-18

## 2021-07-13 ENCOUNTER — TELEPHONE (OUTPATIENT)
Dept: RADIOLOGY | Facility: HOSPITAL | Age: 51
End: 2021-07-13

## 2021-07-22 RX ORDER — METOPROLOL SUCCINATE 25 MG/1
25 TABLET, EXTENDED RELEASE ORAL DAILY
Qty: 30 TABLET | Refills: 0 | Status: SHIPPED | OUTPATIENT
Start: 2021-07-22 | End: 2021-09-15 | Stop reason: SDUPTHER

## 2021-09-15 ENCOUNTER — TELEPHONE (OUTPATIENT)
Dept: FAMILY MEDICINE | Facility: CLINIC | Age: 51
End: 2021-09-15

## 2021-09-15 RX ORDER — METOPROLOL SUCCINATE 25 MG/1
25 TABLET, EXTENDED RELEASE ORAL DAILY
Qty: 90 TABLET | Refills: 0 | Status: SHIPPED | OUTPATIENT
Start: 2021-09-15 | End: 2022-09-15

## 2021-10-14 ENCOUNTER — OFFICE VISIT (OUTPATIENT)
Dept: FAMILY MEDICINE | Facility: CLINIC | Age: 51
End: 2021-10-14
Payer: COMMERCIAL

## 2021-10-14 VITALS
BODY MASS INDEX: 26.12 KG/M2 | RESPIRATION RATE: 18 BRPM | SYSTOLIC BLOOD PRESSURE: 119 MMHG | DIASTOLIC BLOOD PRESSURE: 70 MMHG | TEMPERATURE: 98 F | HEART RATE: 98 BPM | OXYGEN SATURATION: 98 % | HEIGHT: 68 IN | WEIGHT: 172.31 LBS

## 2021-10-14 DIAGNOSIS — Z00.00 PREVENTATIVE HEALTH CARE: Primary | ICD-10-CM

## 2021-10-14 DIAGNOSIS — Z23 NEEDS FLU SHOT: ICD-10-CM

## 2021-10-14 DIAGNOSIS — E66.3 OVERWEIGHT (BMI 25.0-29.9): ICD-10-CM

## 2021-10-14 DIAGNOSIS — R31.9 HEMATURIA, UNSPECIFIED TYPE: ICD-10-CM

## 2021-10-14 PROBLEM — E87.5 HYPERKALEMIA: Status: RESOLVED | Noted: 2018-03-13 | Resolved: 2021-10-14

## 2021-10-14 PROCEDURE — 3078F PR MOST RECENT DIASTOLIC BLOOD PRESSURE < 80 MM HG: ICD-10-PCS | Mod: CPTII,S$GLB,, | Performed by: REGISTERED NURSE

## 2021-10-14 PROCEDURE — 90471 IMMUNIZATION ADMIN: CPT | Mod: S$GLB,,, | Performed by: REGISTERED NURSE

## 2021-10-14 PROCEDURE — 3008F PR BODY MASS INDEX (BMI) DOCUMENTED: ICD-10-PCS | Mod: CPTII,S$GLB,, | Performed by: REGISTERED NURSE

## 2021-10-14 PROCEDURE — 99396 PR PREVENTIVE VISIT,EST,40-64: ICD-10-PCS | Mod: 25,S$GLB,, | Performed by: REGISTERED NURSE

## 2021-10-14 PROCEDURE — 1159F PR MEDICATION LIST DOCUMENTED IN MEDICAL RECORD: ICD-10-PCS | Mod: CPTII,S$GLB,, | Performed by: REGISTERED NURSE

## 2021-10-14 PROCEDURE — 99999 PR PBB SHADOW E&M-EST. PATIENT-LVL V: ICD-10-PCS | Mod: PBBFAC,,, | Performed by: REGISTERED NURSE

## 2021-10-14 PROCEDURE — 99999 PR PBB SHADOW E&M-EST. PATIENT-LVL V: CPT | Mod: PBBFAC,,, | Performed by: REGISTERED NURSE

## 2021-10-14 PROCEDURE — 3074F SYST BP LT 130 MM HG: CPT | Mod: CPTII,S$GLB,, | Performed by: REGISTERED NURSE

## 2021-10-14 PROCEDURE — 3008F BODY MASS INDEX DOCD: CPT | Mod: CPTII,S$GLB,, | Performed by: REGISTERED NURSE

## 2021-10-14 PROCEDURE — 90471 FLU VACCINE (QUAD) GREATER THAN OR EQUAL TO 3YO PRESERVATIVE FREE IM: ICD-10-PCS | Mod: S$GLB,,, | Performed by: REGISTERED NURSE

## 2021-10-14 PROCEDURE — 3078F DIAST BP <80 MM HG: CPT | Mod: CPTII,S$GLB,, | Performed by: REGISTERED NURSE

## 2021-10-14 PROCEDURE — 3074F PR MOST RECENT SYSTOLIC BLOOD PRESSURE < 130 MM HG: ICD-10-PCS | Mod: CPTII,S$GLB,, | Performed by: REGISTERED NURSE

## 2021-10-14 PROCEDURE — 90686 FLU VACCINE (QUAD) GREATER THAN OR EQUAL TO 3YO PRESERVATIVE FREE IM: ICD-10-PCS | Mod: S$GLB,,, | Performed by: REGISTERED NURSE

## 2021-10-14 PROCEDURE — 1159F MED LIST DOCD IN RCRD: CPT | Mod: CPTII,S$GLB,, | Performed by: REGISTERED NURSE

## 2021-10-14 PROCEDURE — 99396 PREV VISIT EST AGE 40-64: CPT | Mod: 25,S$GLB,, | Performed by: REGISTERED NURSE

## 2021-10-14 PROCEDURE — 90686 IIV4 VACC NO PRSV 0.5 ML IM: CPT | Mod: S$GLB,,, | Performed by: REGISTERED NURSE

## 2021-10-14 RX ORDER — CYCLOBENZAPRINE HCL 10 MG
10 TABLET ORAL DAILY PRN
COMMUNITY
Start: 2021-07-06

## 2021-10-15 ENCOUNTER — PATIENT OUTREACH (OUTPATIENT)
Dept: ADMINISTRATIVE | Facility: HOSPITAL | Age: 51
End: 2021-10-15

## 2021-10-19 ENCOUNTER — PATIENT OUTREACH (OUTPATIENT)
Dept: ADMINISTRATIVE | Facility: HOSPITAL | Age: 51
End: 2021-10-19
Payer: COMMERCIAL

## 2021-11-09 ENCOUNTER — OFFICE VISIT (OUTPATIENT)
Dept: FAMILY MEDICINE | Facility: CLINIC | Age: 51
End: 2021-11-09
Payer: COMMERCIAL

## 2021-11-09 ENCOUNTER — LAB VISIT (OUTPATIENT)
Dept: LAB | Facility: HOSPITAL | Age: 51
End: 2021-11-09
Attending: REGISTERED NURSE
Payer: COMMERCIAL

## 2021-11-09 VITALS
DIASTOLIC BLOOD PRESSURE: 80 MMHG | HEART RATE: 97 BPM | OXYGEN SATURATION: 98 % | BODY MASS INDEX: 25.91 KG/M2 | HEIGHT: 68 IN | WEIGHT: 170.94 LBS | TEMPERATURE: 98 F | RESPIRATION RATE: 18 BRPM | SYSTOLIC BLOOD PRESSURE: 120 MMHG

## 2021-11-09 DIAGNOSIS — E66.3 OVERWEIGHT (BMI 25.0-29.9): ICD-10-CM

## 2021-11-09 DIAGNOSIS — R19.5 DARK STOOLS: ICD-10-CM

## 2021-11-09 DIAGNOSIS — Z00.00 PREVENTATIVE HEALTH CARE: ICD-10-CM

## 2021-11-09 DIAGNOSIS — R10.9 ABDOMINAL PAIN, UNSPECIFIED ABDOMINAL LOCATION: Primary | ICD-10-CM

## 2021-11-09 LAB
BASOPHILS # BLD AUTO: 0.05 K/UL (ref 0–0.2)
BASOPHILS NFR BLD: 0.5 % (ref 0–1.9)
DIFFERENTIAL METHOD: ABNORMAL
EOSINOPHIL # BLD AUTO: 0.3 K/UL (ref 0–0.5)
EOSINOPHIL NFR BLD: 2.6 % (ref 0–8)
ERYTHROCYTE [DISTWIDTH] IN BLOOD BY AUTOMATED COUNT: 13.1 % (ref 11.5–14.5)
HCT VFR BLD AUTO: 40.7 % (ref 37–48.5)
HGB BLD-MCNC: 12.9 G/DL (ref 12–16)
IMM GRANULOCYTES # BLD AUTO: 0.03 K/UL (ref 0–0.04)
IMM GRANULOCYTES NFR BLD AUTO: 0.3 % (ref 0–0.5)
LYMPHOCYTES # BLD AUTO: 3.5 K/UL (ref 1–4.8)
LYMPHOCYTES NFR BLD: 33.1 % (ref 18–48)
MCH RBC QN AUTO: 30.8 PG (ref 27–31)
MCHC RBC AUTO-ENTMCNC: 31.7 G/DL (ref 32–36)
MCV RBC AUTO: 97 FL (ref 82–98)
MONOCYTES # BLD AUTO: 0.8 K/UL (ref 0.3–1)
MONOCYTES NFR BLD: 7 % (ref 4–15)
NEUTROPHILS # BLD AUTO: 6 K/UL (ref 1.8–7.7)
NEUTROPHILS NFR BLD: 56.5 % (ref 38–73)
NRBC BLD-RTO: 0 /100 WBC
PLATELET # BLD AUTO: 300 K/UL (ref 150–450)
PMV BLD AUTO: 10.8 FL (ref 9.2–12.9)
RBC # BLD AUTO: 4.19 M/UL (ref 4–5.4)
WBC # BLD AUTO: 10.64 K/UL (ref 3.9–12.7)

## 2021-11-09 PROCEDURE — 36415 COLL VENOUS BLD VENIPUNCTURE: CPT | Mod: PO | Performed by: REGISTERED NURSE

## 2021-11-09 PROCEDURE — 3008F BODY MASS INDEX DOCD: CPT | Mod: CPTII,S$GLB,, | Performed by: REGISTERED NURSE

## 2021-11-09 PROCEDURE — 1159F PR MEDICATION LIST DOCUMENTED IN MEDICAL RECORD: ICD-10-PCS | Mod: CPTII,S$GLB,, | Performed by: REGISTERED NURSE

## 2021-11-09 PROCEDURE — 3008F PR BODY MASS INDEX (BMI) DOCUMENTED: ICD-10-PCS | Mod: CPTII,S$GLB,, | Performed by: REGISTERED NURSE

## 2021-11-09 PROCEDURE — 80061 LIPID PANEL: CPT | Performed by: REGISTERED NURSE

## 2021-11-09 PROCEDURE — 80053 COMPREHEN METABOLIC PANEL: CPT | Performed by: REGISTERED NURSE

## 2021-11-09 PROCEDURE — 99999 PR PBB SHADOW E&M-EST. PATIENT-LVL IV: ICD-10-PCS | Mod: PBBFAC,,, | Performed by: REGISTERED NURSE

## 2021-11-09 PROCEDURE — 99214 PR OFFICE/OUTPT VISIT, EST, LEVL IV, 30-39 MIN: ICD-10-PCS | Mod: S$GLB,,, | Performed by: REGISTERED NURSE

## 2021-11-09 PROCEDURE — 3074F PR MOST RECENT SYSTOLIC BLOOD PRESSURE < 130 MM HG: ICD-10-PCS | Mod: CPTII,S$GLB,, | Performed by: REGISTERED NURSE

## 2021-11-09 PROCEDURE — 99214 OFFICE O/P EST MOD 30 MIN: CPT | Mod: S$GLB,,, | Performed by: REGISTERED NURSE

## 2021-11-09 PROCEDURE — 1159F MED LIST DOCD IN RCRD: CPT | Mod: CPTII,S$GLB,, | Performed by: REGISTERED NURSE

## 2021-11-09 PROCEDURE — 3079F PR MOST RECENT DIASTOLIC BLOOD PRESSURE 80-89 MM HG: ICD-10-PCS | Mod: CPTII,S$GLB,, | Performed by: REGISTERED NURSE

## 2021-11-09 PROCEDURE — 3074F SYST BP LT 130 MM HG: CPT | Mod: CPTII,S$GLB,, | Performed by: REGISTERED NURSE

## 2021-11-09 PROCEDURE — 3079F DIAST BP 80-89 MM HG: CPT | Mod: CPTII,S$GLB,, | Performed by: REGISTERED NURSE

## 2021-11-09 PROCEDURE — 85025 COMPLETE CBC W/AUTO DIFF WBC: CPT | Performed by: REGISTERED NURSE

## 2021-11-09 PROCEDURE — 99999 PR PBB SHADOW E&M-EST. PATIENT-LVL IV: CPT | Mod: PBBFAC,,, | Performed by: REGISTERED NURSE

## 2021-11-09 RX ORDER — PHENOL/SODIUM PHENOLATE
20 AEROSOL, SPRAY (ML) MUCOUS MEMBRANE 2 TIMES DAILY
Qty: 60 EACH | Refills: 6 | Status: SHIPPED | OUTPATIENT
Start: 2021-11-09 | End: 2022-03-07

## 2021-11-10 LAB
ALBUMIN SERPL BCP-MCNC: 4.4 G/DL (ref 3.5–5.2)
ALP SERPL-CCNC: 72 U/L (ref 55–135)
ALT SERPL W/O P-5'-P-CCNC: 13 U/L (ref 10–44)
ANION GAP SERPL CALC-SCNC: 7 MMOL/L (ref 8–16)
AST SERPL-CCNC: 24 U/L (ref 10–40)
BILIRUB SERPL-MCNC: 0.3 MG/DL (ref 0.1–1)
BUN SERPL-MCNC: 8 MG/DL (ref 6–20)
CALCIUM SERPL-MCNC: 9.9 MG/DL (ref 8.7–10.5)
CHLORIDE SERPL-SCNC: 106 MMOL/L (ref 95–110)
CHOLEST SERPL-MCNC: 243 MG/DL (ref 120–199)
CHOLEST/HDLC SERPL: 4.1 {RATIO} (ref 2–5)
CO2 SERPL-SCNC: 27 MMOL/L (ref 23–29)
CREAT SERPL-MCNC: 1 MG/DL (ref 0.5–1.4)
EST. GFR  (AFRICAN AMERICAN): >60 ML/MIN/1.73 M^2
EST. GFR  (NON AFRICAN AMERICAN): >60 ML/MIN/1.73 M^2
GLUCOSE SERPL-MCNC: 86 MG/DL (ref 70–110)
HDLC SERPL-MCNC: 60 MG/DL (ref 40–75)
HDLC SERPL: 24.7 % (ref 20–50)
LDLC SERPL CALC-MCNC: 152.6 MG/DL (ref 63–159)
NONHDLC SERPL-MCNC: 183 MG/DL
POTASSIUM SERPL-SCNC: 3.8 MMOL/L (ref 3.5–5.1)
PROT SERPL-MCNC: 7.6 G/DL (ref 6–8.4)
SODIUM SERPL-SCNC: 140 MMOL/L (ref 136–145)
TRIGL SERPL-MCNC: 152 MG/DL (ref 30–150)

## 2021-12-08 LAB — CRC RECOMMENDATION EXT: NORMAL

## 2021-12-20 ENCOUNTER — OFFICE VISIT (OUTPATIENT)
Dept: CARDIOLOGY | Facility: CLINIC | Age: 51
End: 2021-12-20
Payer: COMMERCIAL

## 2021-12-20 ENCOUNTER — HOSPITAL ENCOUNTER (OUTPATIENT)
Dept: CARDIOLOGY | Facility: HOSPITAL | Age: 51
Discharge: HOME | End: 2021-12-20
Attending: INTERNAL MEDICINE
Payer: COMMERCIAL

## 2021-12-20 VITALS
HEART RATE: 63 BPM | DIASTOLIC BLOOD PRESSURE: 80 MMHG | WEIGHT: 173.94 LBS | HEIGHT: 68 IN | BODY MASS INDEX: 26.36 KG/M2 | SYSTOLIC BLOOD PRESSURE: 112 MMHG

## 2021-12-20 DIAGNOSIS — R07.9 CHEST PAIN, MODERATE CORONARY ARTERY RISK: ICD-10-CM

## 2021-12-20 DIAGNOSIS — E78.00 PURE HYPERCHOLESTEROLEMIA: ICD-10-CM

## 2021-12-20 DIAGNOSIS — I10 PRIMARY HYPERTENSION: ICD-10-CM

## 2021-12-20 DIAGNOSIS — F17.200 SMOKER: ICD-10-CM

## 2021-12-20 PROCEDURE — 99999 PR PBB SHADOW E&M-EST. PATIENT-LVL III: CPT | Mod: PBBFAC,,, | Performed by: INTERNAL MEDICINE

## 2021-12-20 PROCEDURE — 93005 ELECTROCARDIOGRAM TRACING: CPT

## 2021-12-20 PROCEDURE — 93010 ELECTROCARDIOGRAM REPORT: CPT | Mod: ,,, | Performed by: INTERNAL MEDICINE

## 2021-12-20 PROCEDURE — 99205 PR OFFICE/OUTPT VISIT, NEW, LEVL V, 60-74 MIN: ICD-10-PCS | Mod: S$GLB,,, | Performed by: INTERNAL MEDICINE

## 2021-12-20 PROCEDURE — 99999 PR PBB SHADOW E&M-EST. PATIENT-LVL III: ICD-10-PCS | Mod: PBBFAC,,, | Performed by: INTERNAL MEDICINE

## 2021-12-20 PROCEDURE — 99205 OFFICE O/P NEW HI 60 MIN: CPT | Mod: S$GLB,,, | Performed by: INTERNAL MEDICINE

## 2021-12-20 PROCEDURE — 93010 EKG 12-LEAD: ICD-10-PCS | Mod: ,,, | Performed by: INTERNAL MEDICINE

## 2021-12-22 ENCOUNTER — IMMUNIZATION (OUTPATIENT)
Dept: PRIMARY CARE CLINIC | Facility: CLINIC | Age: 51
End: 2021-12-22
Payer: COMMERCIAL

## 2021-12-22 DIAGNOSIS — Z23 NEED FOR VACCINATION: Primary | ICD-10-CM

## 2021-12-22 PROCEDURE — 0004A COVID-19, MRNA, LNP-S, PF, 30 MCG/0.3 ML DOSE VACCINE: CPT | Mod: CV19,PBBFAC | Performed by: FAMILY MEDICINE

## 2021-12-28 ENCOUNTER — HOSPITAL ENCOUNTER (OUTPATIENT)
Dept: CARDIOLOGY | Facility: HOSPITAL | Age: 51
Discharge: HOME | End: 2021-12-28
Attending: INTERNAL MEDICINE
Payer: COMMERCIAL

## 2021-12-28 VITALS
BODY MASS INDEX: 26.22 KG/M2 | WEIGHT: 173 LBS | SYSTOLIC BLOOD PRESSURE: 109 MMHG | DIASTOLIC BLOOD PRESSURE: 72 MMHG | DIASTOLIC BLOOD PRESSURE: 71 MMHG | HEART RATE: 71 BPM | HEIGHT: 68 IN | BODY MASS INDEX: 26.22 KG/M2 | HEIGHT: 68 IN | WEIGHT: 173 LBS | SYSTOLIC BLOOD PRESSURE: 109 MMHG | HEART RATE: 72 BPM

## 2021-12-28 DIAGNOSIS — R07.9 CHEST PAIN, MODERATE CORONARY ARTERY RISK: ICD-10-CM

## 2021-12-28 LAB
CV STRESS BASE HR: 92 BPM
DIASTOLIC BLOOD PRESSURE: 72 MMHG
OHS CV CPX 1 MINUTE RECOVERY HEART RATE: 151 BPM
OHS CV CPX 85 PERCENT MAX PREDICTED HEART RATE MALE: 136
OHS CV CPX ESTIMATED METS: 10
OHS CV CPX MAX PREDICTED HEART RATE: 160
OHS CV CPX PATIENT IS FEMALE: 1
OHS CV CPX PATIENT IS MALE: 0
OHS CV CPX PEAK DIASTOLIC BLOOD PRESSURE: 83 MMHG
OHS CV CPX PEAK HEAR RATE: 169 BPM
OHS CV CPX PEAK RATE PRESSURE PRODUCT: NORMAL
OHS CV CPX PEAK SYSTOLIC BLOOD PRESSURE: 181 MMHG
OHS CV CPX PERCENT MAX PREDICTED HEART RATE ACHIEVED: 105
OHS CV CPX RATE PRESSURE PRODUCT PRESENTING: NORMAL
STRESS ECHO POST EXERCISE DUR MIN: 7 MINUTES
STRESS ECHO POST EXERCISE DUR SEC: 0 SECONDS
STRESS ST DEPRESSION: 2 MM
SYSTOLIC BLOOD PRESSURE: 109 MMHG

## 2021-12-28 PROCEDURE — 93016 EXERCISE STRESS - EKG (CUPID ONLY): ICD-10-PCS | Mod: ,,, | Performed by: INTERNAL MEDICINE

## 2021-12-28 PROCEDURE — 93306 ECHO (CUPID ONLY): ICD-10-PCS | Mod: 26,,, | Performed by: INTERNAL MEDICINE

## 2021-12-28 PROCEDURE — 93016 CV STRESS TEST SUPVJ ONLY: CPT | Mod: ,,, | Performed by: INTERNAL MEDICINE

## 2021-12-28 PROCEDURE — 93018 CV STRESS TEST I&R ONLY: CPT | Mod: ,,, | Performed by: INTERNAL MEDICINE

## 2021-12-28 PROCEDURE — 93018 EXERCISE STRESS - EKG (CUPID ONLY): ICD-10-PCS | Mod: ,,, | Performed by: INTERNAL MEDICINE

## 2021-12-28 PROCEDURE — 93306 TTE W/DOPPLER COMPLETE: CPT | Mod: 26,,, | Performed by: INTERNAL MEDICINE

## 2021-12-28 PROCEDURE — 93017 CV STRESS TEST TRACING ONLY: CPT

## 2021-12-28 PROCEDURE — 93306 TTE W/DOPPLER COMPLETE: CPT

## 2021-12-29 ENCOUNTER — TELEPHONE (OUTPATIENT)
Dept: CARDIOLOGY | Facility: CLINIC | Age: 51
End: 2021-12-29
Payer: COMMERCIAL

## 2021-12-29 LAB
AORTIC ROOT ANNULUS: 2.54 CM
AV INDEX (PROSTH): 0.76
AV MEAN GRADIENT: 6 MMHG
AV PEAK GRADIENT: 11 MMHG
AV VALVE AREA: 2.31 CM2
AV VELOCITY RATIO: 0.75
BSA FOR ECHO PROCEDURE: 1.94 M2
CV ECHO LV RWT: 0.28 CM
DOP CALC AO PEAK VEL: 1.67 M/S
DOP CALC AO VTI: 34.5 CM
DOP CALC LVOT AREA: 3 CM2
DOP CALC LVOT DIAMETER: 1.97 CM
DOP CALC LVOT PEAK VEL: 1.26 M/S
DOP CALC LVOT STROKE VOLUME: 79.82 CM3
DOP CALC RVOT PEAK VEL: 0.9 M/S
DOP CALC RVOT VTI: 19.2 CM
DOP CALCLVOT PEAK VEL VTI: 26.2 CM
E WAVE DECELERATION TIME: 207.72 MSEC
E/A RATIO: 1.38
E/E' RATIO: 7.33 M/S
ECHO EF ESTIMATED: 65 %
ECHO LV POSTERIOR WALL: 0.63 CM (ref 0.6–1.1)
EJECTION FRACTION: 60 %
FRACTIONAL SHORTENING: 36 % (ref 28–44)
INTERVENTRICULAR SEPTUM: 0.89 CM (ref 0.6–1.1)
IVRT: 105.61 MSEC
LA MAJOR: 3.58 CM
LA MINOR: 4.07 CM
LA WIDTH: 3.3 CM
LEFT ATRIUM SIZE: 3 CM
LEFT ATRIUM VOLUME INDEX MOD: 15.6 ML/M2
LEFT ATRIUM VOLUME INDEX: 16.7 ML/M2
LEFT ATRIUM VOLUME MOD: 29.99 CM3
LEFT ATRIUM VOLUME: 32.06 CM3
LEFT INTERNAL DIMENSION IN SYSTOLE: 2.89 CM (ref 2.1–4)
LEFT VENTRICLE DIASTOLIC VOLUME INDEX: 47.84 ML/M2
LEFT VENTRICLE DIASTOLIC VOLUME: 91.86 ML
LEFT VENTRICLE MASS INDEX: 55 G/M2
LEFT VENTRICLE SYSTOLIC VOLUME INDEX: 16.6 ML/M2
LEFT VENTRICLE SYSTOLIC VOLUME: 31.82 ML
LEFT VENTRICULAR INTERNAL DIMENSION IN DIASTOLE: 4.49 CM (ref 3.5–6)
LEFT VENTRICULAR MASS: 105.9 G
LV LATERAL E/E' RATIO: 7.33 M/S
LV SEPTAL E/E' RATIO: 7.33 M/S
LVOT MG: 3.51 MMHG
LVOT MV: 0.88 CM/S
MV PEAK A VEL: 0.64 M/S
MV PEAK E VEL: 0.88 M/S
MV STENOSIS PRESSURE HALF TIME: 60.24 MS
MV VALVE AREA P 1/2 METHOD: 3.65 CM2
PISA TR MAX VEL: 1.92 M/S
PULM VEIN S/D RATIO: 1.38
PV MEAN GRADIENT: 1.84 MMHG
PV PEAK D VEL: 0.42 M/S
PV PEAK S VEL: 0.58 M/S
PV PEAK VELOCITY: 0.91 CM/S
RA MAJOR: 3.6 CM
RA PRESSURE: 3 MMHG
RA WIDTH: 3 CM
RIGHT VENTRICULAR END-DIASTOLIC DIMENSION: 3.35 CM
SINUS: 2.6 CM
STJ: 2.62 CM
TDI LATERAL: 0.12 M/S
TDI SEPTAL: 0.12 M/S
TDI: 0.12 M/S
TR MAX PG: 15 MMHG
TRICUSPID ANNULAR PLANE SYSTOLIC EXCURSION: 1.82 CM
TV REST PULMONARY ARTERY PRESSURE: 18 MMHG

## 2022-01-03 ENCOUNTER — OFFICE VISIT (OUTPATIENT)
Dept: CARDIOLOGY | Facility: CLINIC | Age: 52
End: 2022-01-03
Payer: COMMERCIAL

## 2022-01-03 VITALS
OXYGEN SATURATION: 98 % | HEIGHT: 68 IN | SYSTOLIC BLOOD PRESSURE: 130 MMHG | DIASTOLIC BLOOD PRESSURE: 72 MMHG | WEIGHT: 175.5 LBS | HEART RATE: 67 BPM | BODY MASS INDEX: 26.6 KG/M2

## 2022-01-03 DIAGNOSIS — R07.9 CHEST PAIN, MODERATE CORONARY ARTERY RISK: Primary | ICD-10-CM

## 2022-01-03 DIAGNOSIS — I10 PRIMARY HYPERTENSION: ICD-10-CM

## 2022-01-03 DIAGNOSIS — F17.200 SMOKER: ICD-10-CM

## 2022-01-03 DIAGNOSIS — E78.00 PURE HYPERCHOLESTEROLEMIA: ICD-10-CM

## 2022-01-03 PROCEDURE — 1159F MED LIST DOCD IN RCRD: CPT | Mod: CPTII,S$GLB,, | Performed by: INTERNAL MEDICINE

## 2022-01-03 PROCEDURE — 99214 PR OFFICE/OUTPT VISIT, EST, LEVL IV, 30-39 MIN: ICD-10-PCS | Mod: S$GLB,,, | Performed by: INTERNAL MEDICINE

## 2022-01-03 PROCEDURE — 99214 OFFICE O/P EST MOD 30 MIN: CPT | Mod: S$GLB,,, | Performed by: INTERNAL MEDICINE

## 2022-01-03 PROCEDURE — 3075F SYST BP GE 130 - 139MM HG: CPT | Mod: CPTII,S$GLB,, | Performed by: INTERNAL MEDICINE

## 2022-01-03 PROCEDURE — 3078F DIAST BP <80 MM HG: CPT | Mod: CPTII,S$GLB,, | Performed by: INTERNAL MEDICINE

## 2022-01-03 PROCEDURE — 3008F BODY MASS INDEX DOCD: CPT | Mod: CPTII,S$GLB,, | Performed by: INTERNAL MEDICINE

## 2022-01-03 PROCEDURE — 1159F PR MEDICATION LIST DOCUMENTED IN MEDICAL RECORD: ICD-10-PCS | Mod: CPTII,S$GLB,, | Performed by: INTERNAL MEDICINE

## 2022-01-03 PROCEDURE — 3075F PR MOST RECENT SYSTOLIC BLOOD PRESS GE 130-139MM HG: ICD-10-PCS | Mod: CPTII,S$GLB,, | Performed by: INTERNAL MEDICINE

## 2022-01-03 PROCEDURE — 3078F PR MOST RECENT DIASTOLIC BLOOD PRESSURE < 80 MM HG: ICD-10-PCS | Mod: CPTII,S$GLB,, | Performed by: INTERNAL MEDICINE

## 2022-01-03 PROCEDURE — 99999 PR PBB SHADOW E&M-EST. PATIENT-LVL III: ICD-10-PCS | Mod: PBBFAC,,, | Performed by: INTERNAL MEDICINE

## 2022-01-03 PROCEDURE — 1160F PR REVIEW ALL MEDS BY PRESCRIBER/CLIN PHARMACIST DOCUMENTED: ICD-10-PCS | Mod: CPTII,S$GLB,, | Performed by: INTERNAL MEDICINE

## 2022-01-03 PROCEDURE — 3008F PR BODY MASS INDEX (BMI) DOCUMENTED: ICD-10-PCS | Mod: CPTII,S$GLB,, | Performed by: INTERNAL MEDICINE

## 2022-01-03 PROCEDURE — 1160F RVW MEDS BY RX/DR IN RCRD: CPT | Mod: CPTII,S$GLB,, | Performed by: INTERNAL MEDICINE

## 2022-01-03 PROCEDURE — 99999 PR PBB SHADOW E&M-EST. PATIENT-LVL III: CPT | Mod: PBBFAC,,, | Performed by: INTERNAL MEDICINE

## 2022-01-03 NOTE — PROGRESS NOTES
Subjective:   Patient ID:  Sabrina Collins is a 51 y.o. female who presents for follow-up of [facility]   Pt with abnormal ekg stress test. Echo nml.  CRF- HTN, HLP, family hx of CAD, smoker     Chest Pain   This is a new problem. The current episode started 1 to 4 weeks ago. The problem occurs intermittently. The problem has been waxing and waning. The pain is present in the substernal region. The pain is mild. The quality of the pain is described as dull. The pain does not radiate. Pertinent negatives include no dizziness, palpitations or shortness of breath. The pain is aggravated by nothing. She has tried rest for the symptoms. The treatment provided mild relief.   Her past medical history is significant for hyperlipidemia and hypertension.   Pertinent negatives for past medical history include no muscle weakness.   Hypertension  This is a chronic problem. The current episode started more than 1 year ago. The problem has been gradually improving since onset. The problem is controlled. Pertinent negatives include no chest pain, palpitations or shortness of breath. Past treatments include beta blockers. The current treatment provides moderate improvement. There are no compliance problems.    Hyperlipidemia  This is a chronic problem. The current episode started more than 1 year ago. Pertinent negatives include no chest pain or shortness of breath. She is currently on no antihyperlipidemic treatment. The current treatment provides mild improvement of lipids. Risk factors for coronary artery disease include hypertension and dyslipidemia.       Review of Systems   Constitutional: Negative. Negative for weight gain.   HENT: Negative.    Eyes: Negative.    Cardiovascular: Negative.  Negative for chest pain, leg swelling and palpitations.   Respiratory: Negative.  Negative for chest tightness and shortness of breath.    Endocrine: Negative.    Hematologic/Lymphatic: Negative.    Skin: Negative.    Musculoskeletal: Negative  for muscle weakness.   Gastrointestinal: Negative.    Genitourinary: Negative.    Neurological: Negative.  Negative for dizziness.   Psychiatric/Behavioral: Negative.    Allergic/Immunologic: Negative.      Family History   Problem Relation Age of Onset    Heart disease Mother 35        MI x 2    Cancer Mother         breast,     Heart disease Maternal Grandmother     Cancer Maternal Grandmother         colon CA, lung ca (not metastatic)    Diabetes Maternal Grandmother     Heart disease Paternal Grandfather     Cancer Paternal Aunt         colon ca     Cancer Maternal Grandfather         melanoma, colon ca    Cancer Paternal Grandmother         colon ca     Past Medical History:   Diagnosis Date    HSV (herpes simplex virus) anogenital infection     Hyperkalemia 3/13/2018     Social History     Socioeconomic History    Marital status:     Number of children: 2   Occupational History    Occupation: teacher      Employer: [employer]     Comment: [employer]   Tobacco Use    Smoking status: Current Every Day Smoker     Packs/day: 0.30     Types: Cigarettes    Smokeless tobacco: Never Used   Substance and Sexual Activity    Alcohol use: No     Alcohol/week: 0.0 standard drinks     Comment: sober since 9/5/2009    Drug use: No    Sexual activity: Yes     Partners: Male     Social Determinants of Health     Financial Resource Strain: Low Risk     Difficulty of Paying Living Expenses: Not very hard   Food Insecurity: No Food Insecurity    Worried About Running Out of Food in the Last Year: Never true    Ran Out of Food in the Last Year: Never true   Transportation Needs: No Transportation Needs    Lack of Transportation (Medical): No    Lack of Transportation (Non-Medical): No   Physical Activity: Insufficiently Active    Days of Exercise per Week: 3 days    Minutes of Exercise per Session: 20 min   Stress: No Stress Concern Present    Feeling of Stress : Only a little   Social  Connections: Unknown    Frequency of Communication with Friends and Family: Three times a week    Frequency of Social Gatherings with Friends and Family: Once a week    Active Member of Clubs or Organizations: Yes    Attends Club or Organization Meetings: 1 to 4 times per year    Marital Status:    Housing Stability: Low Risk     Unable to Pay for Housing in the Last Year: No    Number of Places Lived in the Last Year: 1    Unstable Housing in the Last Year: No     Current Outpatient Medications on File Prior to Visit   Medication Sig Dispense Refill    metoprolol succinate (TOPROL-XL) 25 MG 24 hr tablet Take 1 tablet (25 mg total) by mouth once daily. 90 tablet 0    albuterol (PROVENTIL/VENTOLIN HFA) 90 mcg/actuation inhaler Inhale 2 puffs into the lungs every 6 (six) hours as needed for Shortness of Breath. 18 g 0    beclomethasone (QVAR) 40 mcg/actuation Aero Inhale 1 puff into the lungs 2 (two) times daily. Controller 8.7 g 0    blood pressure monitor Kit 1 kit by Misc.(Non-Drug; Combo Route) route once daily. 1 each 0    butalbital-acetaminophen-caffeine -40 mg (FIORICET, ESGIC) -40 mg per tablet Take 1 tablet by mouth every 4 to 6 hours as needed for Headaches. 45 tablet 0    clobetasol (TEMOVATE) 0.05 % cream Apply topically 2 (two) times daily.      cyclobenzaprine (FLEXERIL) 10 MG tablet Take 10 mg by mouth daily as needed.      dicyclomine (BENTYL) 10 MG capsule Take 10 mg by mouth daily as needed.       hyoscyamine (LEVSIN) 0.125 mg TbDL Take 0.125 mg by mouth every 4 (four) hours as needed for Cramping.      methocarbamoL (ROBAXIN) 750 MG Tab Take 1 tablet (750 mg total) by mouth 2 (two) times daily as needed. 60 tablet 0    mupirocin (BACTROBAN) 2 % ointment Apply topically 3 (three) times daily. 30 g 0    naproxen sodium (ANAPROX) 220 MG tablet Take 220 mg by mouth 2 (two) times daily as needed.      omeprazole 20 mg TbEC Take 20 mg by mouth 2 (two) times a day.  (Patient not taking: Reported on 12/20/2021) 60 each 6    valACYclovir (VALTREX) 1000 MG tablet Take 1 tablet (1,000 mg total) by mouth once daily. Take 2 tablets q 12 hours at onset for two doses 30 tablet 0     No current facility-administered medications on file prior to visit.     Review of patient's allergies indicates:   Allergen Reactions    Tetanus vaccines and toxoid        Objective:     Physical Exam  Vitals and nursing note reviewed.   Constitutional:       Appearance: She is well-developed and well-nourished.   HENT:      Head: Normocephalic and atraumatic.   Eyes:      Extraocular Movements: EOM normal.      Conjunctiva/sclera: Conjunctivae normal.      Pupils: Pupils are equal, round, and reactive to light.   Cardiovascular:      Rate and Rhythm: Normal rate and regular rhythm.      Pulses: Intact distal pulses.      Heart sounds: Normal heart sounds.   Pulmonary:      Effort: Pulmonary effort is normal.      Breath sounds: Normal breath sounds.   Abdominal:      General: Bowel sounds are normal.      Palpations: Abdomen is soft.   Musculoskeletal:         General: Normal range of motion.      Cervical back: Normal range of motion and neck supple.   Skin:     General: Skin is warm and dry.   Neurological:      Mental Status: She is alert and oriented to person, place, and time.   Psychiatric:         Mood and Affect: Mood and affect normal.         Assessment:     1. Chest pain, moderate coronary artery risk    2. Primary hypertension    3. Pure hypercholesterolemia    4. Smoker        Plan:     Chest pain, moderate coronary artery risk    Primary hypertension    Pure hypercholesterolemia    Smoker    recommend asa  Continue metoprolol-htn  Mediterranean diet  Smoking cessation

## 2022-02-21 ENCOUNTER — OFFICE VISIT (OUTPATIENT)
Dept: CARDIOLOGY | Facility: CLINIC | Age: 52
End: 2022-02-21
Payer: COMMERCIAL

## 2022-02-21 VITALS
HEART RATE: 71 BPM | BODY MASS INDEX: 25.91 KG/M2 | WEIGHT: 170.44 LBS | DIASTOLIC BLOOD PRESSURE: 84 MMHG | SYSTOLIC BLOOD PRESSURE: 132 MMHG | OXYGEN SATURATION: 98 %

## 2022-02-21 DIAGNOSIS — R07.9 CHEST PAIN, MODERATE CORONARY ARTERY RISK: ICD-10-CM

## 2022-02-21 DIAGNOSIS — E78.00 PURE HYPERCHOLESTEROLEMIA: ICD-10-CM

## 2022-02-21 DIAGNOSIS — I10 PRIMARY HYPERTENSION: ICD-10-CM

## 2022-02-21 DIAGNOSIS — F17.200 SMOKER: Primary | ICD-10-CM

## 2022-02-21 PROCEDURE — 3075F PR MOST RECENT SYSTOLIC BLOOD PRESS GE 130-139MM HG: ICD-10-PCS | Mod: CPTII,S$GLB,, | Performed by: INTERNAL MEDICINE

## 2022-02-21 PROCEDURE — 3079F DIAST BP 80-89 MM HG: CPT | Mod: CPTII,S$GLB,, | Performed by: INTERNAL MEDICINE

## 2022-02-21 PROCEDURE — 99215 OFFICE O/P EST HI 40 MIN: CPT | Mod: S$GLB,,, | Performed by: INTERNAL MEDICINE

## 2022-02-21 PROCEDURE — 99215 PR OFFICE/OUTPT VISIT, EST, LEVL V, 40-54 MIN: ICD-10-PCS | Mod: S$GLB,,, | Performed by: INTERNAL MEDICINE

## 2022-02-21 PROCEDURE — 3075F SYST BP GE 130 - 139MM HG: CPT | Mod: CPTII,S$GLB,, | Performed by: INTERNAL MEDICINE

## 2022-02-21 PROCEDURE — 1160F PR REVIEW ALL MEDS BY PRESCRIBER/CLIN PHARMACIST DOCUMENTED: ICD-10-PCS | Mod: CPTII,S$GLB,, | Performed by: INTERNAL MEDICINE

## 2022-02-21 PROCEDURE — 99999 PR PBB SHADOW E&M-EST. PATIENT-LVL IV: ICD-10-PCS | Mod: PBBFAC,,, | Performed by: INTERNAL MEDICINE

## 2022-02-21 PROCEDURE — 1159F MED LIST DOCD IN RCRD: CPT | Mod: CPTII,S$GLB,, | Performed by: INTERNAL MEDICINE

## 2022-02-21 PROCEDURE — 3079F PR MOST RECENT DIASTOLIC BLOOD PRESSURE 80-89 MM HG: ICD-10-PCS | Mod: CPTII,S$GLB,, | Performed by: INTERNAL MEDICINE

## 2022-02-21 PROCEDURE — 3008F BODY MASS INDEX DOCD: CPT | Mod: CPTII,S$GLB,, | Performed by: INTERNAL MEDICINE

## 2022-02-21 PROCEDURE — 1159F PR MEDICATION LIST DOCUMENTED IN MEDICAL RECORD: ICD-10-PCS | Mod: CPTII,S$GLB,, | Performed by: INTERNAL MEDICINE

## 2022-02-21 PROCEDURE — 3008F PR BODY MASS INDEX (BMI) DOCUMENTED: ICD-10-PCS | Mod: CPTII,S$GLB,, | Performed by: INTERNAL MEDICINE

## 2022-02-21 PROCEDURE — 99999 PR PBB SHADOW E&M-EST. PATIENT-LVL IV: CPT | Mod: PBBFAC,,, | Performed by: INTERNAL MEDICINE

## 2022-02-21 PROCEDURE — 1160F RVW MEDS BY RX/DR IN RCRD: CPT | Mod: CPTII,S$GLB,, | Performed by: INTERNAL MEDICINE

## 2022-02-21 NOTE — PROGRESS NOTES
Subjective:   Patient ID:  Sabrina Collins is a 51 y.o. female who presents for follow-up of No chief complaint on file.  Echo- normal , EKG inferolateral changes, no CP. Pt walks every day 1 mile without sx  Discussed LHC in past vs nuclear stress test  CRF- HTN, HLP, family hx of CAD, smoker  Chest Pain   This is a new problem. The current episode started 1 to 4 weeks ago. The problem occurs intermittently. The problem has been waxing and waning. The pain is present in the substernal region. The pain is mild. The quality of the pain is described as dull. The pain does not radiate. Pertinent negatives include no dizziness, palpitations or shortness of breath. The pain is aggravated by nothing. She has tried rest for the symptoms. The treatment provided mild relief.   Her past medical history is significant for hyperlipidemia and hypertension.   Pertinent negatives for past medical history include no muscle weakness.   Hypertension  This is a chronic problem. The current episode started more than 1 year ago. The problem has been gradually improving since onset. The problem is controlled. Pertinent negatives include no chest pain, palpitations or shortness of breath. Past treatments include beta blockers. The current treatment provides moderate improvement. There are no compliance problems.    Hyperlipidemia  This is a chronic problem. The current episode started more than 1 year ago. Pertinent negatives include no chest pain or shortness of breath. She is currently on no antihyperlipidemic treatment. The current treatment provides mild improvement of lipids. Risk factors for coronary artery disease include hypertension and dyslipidemia.       Review of Systems   Constitutional: Negative. Negative for weight gain.   HENT: Negative.    Eyes: Negative.    Cardiovascular: Negative.  Negative for chest pain, leg swelling and palpitations.   Respiratory: Negative.  Negative for shortness of breath.    Endocrine:  Negative.    Hematologic/Lymphatic: Negative.    Skin: Negative.    Musculoskeletal: Negative for muscle weakness.   Gastrointestinal: Negative.    Genitourinary: Negative.    Neurological: Negative.  Negative for dizziness.   Psychiatric/Behavioral: Negative.    Allergic/Immunologic: Negative.      Family History   Problem Relation Age of Onset    Heart disease Mother 35        MI x 2    Cancer Mother         breast,     Heart disease Maternal Grandmother     Cancer Maternal Grandmother         colon CA, lung ca (not metastatic)    Diabetes Maternal Grandmother     Heart disease Paternal Grandfather     Cancer Paternal Aunt         colon ca     Cancer Maternal Grandfather         melanoma, colon ca    Cancer Paternal Grandmother         colon ca     Past Medical History:   Diagnosis Date    HSV (herpes simplex virus) anogenital infection     Hyperkalemia 3/13/2018     Social History     Socioeconomic History    Marital status:     Number of children: 2   Occupational History    Occupation: teacher      Employer: [employer]     Comment: [employer]   Tobacco Use    Smoking status: Current Every Day Smoker     Packs/day: 0.30     Types: Cigarettes    Smokeless tobacco: Never Used   Substance and Sexual Activity    Alcohol use: No     Alcohol/week: 0.0 standard drinks     Comment: sober since 9/5/2009    Drug use: No    Sexual activity: Yes     Partners: Male     Social Determinants of Health     Financial Resource Strain: Low Risk     Difficulty of Paying Living Expenses: Not very hard   Food Insecurity: No Food Insecurity    Worried About Running Out of Food in the Last Year: Never true    Ran Out of Food in the Last Year: Never true   Transportation Needs: No Transportation Needs    Lack of Transportation (Medical): No    Lack of Transportation (Non-Medical): No   Physical Activity: Insufficiently Active    Days of Exercise per Week: 3 days    Minutes of Exercise per  Session: 20 min   Stress: No Stress Concern Present    Feeling of Stress : Only a little   Social Connections: Unknown    Frequency of Communication with Friends and Family: Three times a week    Frequency of Social Gatherings with Friends and Family: Once a week    Active Member of Clubs or Organizations: Yes    Attends Club or Organization Meetings: 1 to 4 times per year    Marital Status:    Housing Stability: Low Risk     Unable to Pay for Housing in the Last Year: No    Number of Places Lived in the Last Year: 1    Unstable Housing in the Last Year: No     Current Outpatient Medications on File Prior to Visit   Medication Sig Dispense Refill    albuterol (PROVENTIL/VENTOLIN HFA) 90 mcg/actuation inhaler Inhale 2 puffs into the lungs every 6 (six) hours as needed for Shortness of Breath. 18 g 0    beclomethasone (QVAR) 40 mcg/actuation Aero Inhale 1 puff into the lungs 2 (two) times daily. Controller 8.7 g 0    blood pressure monitor Kit 1 kit by Misc.(Non-Drug; Combo Route) route once daily. 1 each 0    butalbital-acetaminophen-caffeine -40 mg (FIORICET, ESGIC) -40 mg per tablet Take 1 tablet by mouth every 4 to 6 hours as needed for Headaches. 45 tablet 0    clobetasol (TEMOVATE) 0.05 % cream Apply topically 2 (two) times daily.      cyclobenzaprine (FLEXERIL) 10 MG tablet Take 10 mg by mouth daily as needed.      dicyclomine (BENTYL) 10 MG capsule Take 10 mg by mouth daily as needed.       hyoscyamine (LEVSIN) 0.125 mg TbDL Take 0.125 mg by mouth every 4 (four) hours as needed for Cramping.      methocarbamoL (ROBAXIN) 750 MG Tab Take 1 tablet (750 mg total) by mouth 2 (two) times daily as needed. 60 tablet 0    metoprolol succinate (TOPROL-XL) 25 MG 24 hr tablet Take 1 tablet (25 mg total) by mouth once daily. 90 tablet 0    mupirocin (BACTROBAN) 2 % ointment Apply topically 3 (three) times daily. 30 g 0    naproxen sodium (ANAPROX) 220 MG tablet Take 220 mg by mouth 2  (two) times daily as needed.      valACYclovir (VALTREX) 1000 MG tablet Take 1 tablet (1,000 mg total) by mouth once daily. Take 2 tablets q 12 hours at onset for two doses 30 tablet 0    omeprazole 20 mg TbEC Take 20 mg by mouth 2 (two) times a day. (Patient not taking: No sig reported) 60 each 6     No current facility-administered medications on file prior to visit.     Review of patient's allergies indicates:   Allergen Reactions    Tetanus vaccines and toxoid        Objective:     Physical Exam  Vitals and nursing note reviewed.   Constitutional:       Appearance: She is well-developed.   HENT:      Head: Normocephalic and atraumatic.   Eyes:      Conjunctiva/sclera: Conjunctivae normal.      Pupils: Pupils are equal, round, and reactive to light.   Cardiovascular:      Rate and Rhythm: Normal rate and regular rhythm.      Pulses: Intact distal pulses.      Heart sounds: Normal heart sounds.   Pulmonary:      Effort: Pulmonary effort is normal.      Breath sounds: Normal breath sounds.   Abdominal:      General: Bowel sounds are normal.      Palpations: Abdomen is soft.   Musculoskeletal:         General: Normal range of motion.      Cervical back: Normal range of motion and neck supple.   Skin:     General: Skin is warm and dry.   Neurological:      Mental Status: She is alert and oriented to person, place, and time.         Assessment:     1. Smoker    2. Pure hypercholesterolemia    3. Primary hypertension    4. Chest pain, moderate coronary artery risk        Plan:     Smoker    Pure hypercholesterolemia    Primary hypertension    Chest pain, moderate coronary artery risk      recommend asa  Continue metoprolol-htn  Mediterranean diet  Smoking cessation    Nuclear stress test

## 2022-03-02 ENCOUNTER — PATIENT MESSAGE (OUTPATIENT)
Dept: FAMILY MEDICINE | Facility: CLINIC | Age: 52
End: 2022-03-02

## 2022-03-02 ENCOUNTER — OFFICE VISIT (OUTPATIENT)
Dept: FAMILY MEDICINE | Facility: CLINIC | Age: 52
End: 2022-03-02
Attending: FAMILY MEDICINE
Payer: COMMERCIAL

## 2022-03-02 ENCOUNTER — PATIENT MESSAGE (OUTPATIENT)
Dept: ENDOSCOPY | Facility: HOSPITAL | Age: 52
End: 2022-03-02
Payer: COMMERCIAL

## 2022-03-02 DIAGNOSIS — R21 RASH: Primary | ICD-10-CM

## 2022-03-02 PROCEDURE — 99213 PR OFFICE/OUTPT VISIT, EST, LEVL III, 20-29 MIN: ICD-10-PCS | Mod: 95,,, | Performed by: NURSE PRACTITIONER

## 2022-03-02 PROCEDURE — 99213 OFFICE O/P EST LOW 20 MIN: CPT | Mod: 95,,, | Performed by: NURSE PRACTITIONER

## 2022-03-03 ENCOUNTER — HOSPITAL ENCOUNTER (OUTPATIENT)
Dept: RADIOLOGY | Facility: HOSPITAL | Age: 52
Discharge: HOME | End: 2022-03-03
Attending: INTERNAL MEDICINE
Payer: COMMERCIAL

## 2022-03-03 ENCOUNTER — HOSPITAL ENCOUNTER (OUTPATIENT)
Dept: CARDIOLOGY | Facility: HOSPITAL | Age: 52
Discharge: HOME | End: 2022-03-03
Attending: INTERNAL MEDICINE
Payer: COMMERCIAL

## 2022-03-03 DIAGNOSIS — R07.9 CHEST PAIN, MODERATE CORONARY ARTERY RISK: ICD-10-CM

## 2022-03-03 LAB
CV STRESS BASE HR: 96 BPM
DIASTOLIC BLOOD PRESSURE: 72 MMHG
NUC REST EJECTION FRACTION: 76
NUC STRESS EJECTION FRACTION: 78 %
OHS CV CPX 1 MINUTE RECOVERY HEART RATE: 148 BPM
OHS CV CPX 85 PERCENT MAX PREDICTED HEART RATE MALE: 136
OHS CV CPX ESTIMATED METS: 7
OHS CV CPX MAX PREDICTED HEART RATE: 160
OHS CV CPX PATIENT IS FEMALE: 1
OHS CV CPX PATIENT IS MALE: 0
OHS CV CPX PEAK DIASTOLIC BLOOD PRESSURE: 84 MMHG
OHS CV CPX PEAK HEAR RATE: 155 BPM
OHS CV CPX PEAK RATE PRESSURE PRODUCT: NORMAL
OHS CV CPX PEAK SYSTOLIC BLOOD PRESSURE: 136 MMHG
OHS CV CPX PERCENT MAX PREDICTED HEART RATE ACHIEVED: 97
OHS CV CPX RATE PRESSURE PRODUCT PRESENTING: 9408
STRESS ECHO POST EXERCISE DUR MIN: 6 MINUTES
SYSTOLIC BLOOD PRESSURE: 98 MMHG

## 2022-03-03 PROCEDURE — 78452 HT MUSCLE IMAGE SPECT MULT: CPT | Mod: 26,,, | Performed by: INTERNAL MEDICINE

## 2022-03-03 PROCEDURE — A9502 TC99M TETROFOSMIN: HCPCS

## 2022-03-03 PROCEDURE — 93016 STRESS TEST WITH MYOCARDIAL PERFUSION (CUPID ONLY): ICD-10-PCS | Mod: ,,, | Performed by: INTERNAL MEDICINE

## 2022-03-03 PROCEDURE — 78452 STRESS TEST WITH MYOCARDIAL PERFUSION (CUPID ONLY): ICD-10-PCS | Mod: 26,,, | Performed by: INTERNAL MEDICINE

## 2022-03-03 PROCEDURE — 93016 CV STRESS TEST SUPVJ ONLY: CPT | Mod: ,,, | Performed by: INTERNAL MEDICINE

## 2022-03-03 PROCEDURE — 93017 CV STRESS TEST TRACING ONLY: CPT

## 2022-03-03 PROCEDURE — 93018 CV STRESS TEST I&R ONLY: CPT | Mod: ,,, | Performed by: INTERNAL MEDICINE

## 2022-03-03 PROCEDURE — 93018 STRESS TEST WITH MYOCARDIAL PERFUSION (CUPID ONLY): ICD-10-PCS | Mod: ,,, | Performed by: INTERNAL MEDICINE

## 2022-03-04 ENCOUNTER — TELEPHONE (OUTPATIENT)
Dept: CARDIOLOGY | Facility: CLINIC | Age: 52
End: 2022-03-04
Payer: COMMERCIAL

## 2022-03-04 NOTE — TELEPHONE ENCOUNTER
----- Message from Sergo Ibarra MD sent at 3/4/2022  6:16 AM CST -----  Please tell pt:  Stress test is normal

## 2022-03-04 NOTE — TELEPHONE ENCOUNTER
The patient has been notified of this information and all questions answered.  Stress test is normal. Patient verbalized understanding.

## 2022-03-04 NOTE — PROGRESS NOTES
"Subjective:       Patient ID: Sabrina Collins is a 51 y.o. female.    Chief Complaint: No chief complaint on file.  The patient location is: [place]   The chief complaint leading to consultation is: rash    Visit type: audiovisual    Face to Face time with patient: 15  15 minutes of total time spent on the encounter, which includes face to face time and non-face to face time preparing to see the patient (eg, review of tests), Obtaining and/or reviewing separately obtained history, Documenting clinical information in the electronic or other health record, Independently interpreting results (not separately reported) and communicating results to the patient/family/caregiver, or Care coordination (not separately reported).         Each patient to whom he or she provides medical services by telemedicine is:  (1) informed of the relationship between the physician and patient and the respective role of any other health care provider with respect to management of the patient; and (2) notified that he or she may decline to receive medical services by telemedicine and may withdraw from such care at any time.    Notes: Pt reports to clinic with chief complaint of pain rash to arm and torso.  Present for several days.  Denies contact with new plants, animals, chemicals.  Complains of itching but reports, pain  Rash initially began on left arm and has radiated.  Has not taken anything for relief.  Pt describes area as "little bumps or blisters"    Rash  This is a recurrent problem. The current episode started in the past 7 days. The problem has been gradually worsening since onset. The affected locations include the scalp, neck, chest, torso, back, abdomen, left shoulder, left axilla, left arm, left elbow, left hand, left wrist, left fingers, right ear, right shoulder, right axilla, right arm, right elbow, right hand, right wrist and right fingers. The rash is characterized by blistering, burning, pain, redness, swelling, " itchiness and scaling. It is unknown if there was an exposure to a precipitant. Associated symptoms include fatigue and joint pain. Pertinent negatives include no anorexia, congestion, cough, diarrhea, eye pain, facial edema, fever, nail changes, rhinorrhea, shortness of breath, sore throat or vomiting. Past treatments include anti-itch cream, antibiotic cream, antihistamine, cold compress, moisturizer and topical steroids. The treatment provided mild relief. Her past medical history is significant for allergies, asthma, eczema and varicella.     Review of Systems   Constitutional: Positive for fatigue. Negative for fever.   HENT: Negative for congestion, rhinorrhea and sore throat.    Eyes: Negative for pain.   Respiratory: Negative for cough and shortness of breath.    Gastrointestinal: Negative for anorexia, diarrhea and vomiting.   Musculoskeletal: Positive for joint pain.   Skin: Positive for rash. Negative for nail changes.       Objective:      Physical Exam  HENT:      Head: Normocephalic.      Right Ear: External ear normal.      Left Ear: External ear normal.      Nose: Nose normal.      Mouth/Throat:      Mouth: Mucous membranes are dry.   Eyes:      Extraocular Movements: Extraocular movements intact.   Pulmonary:      Effort: Pulmonary effort is normal. No respiratory distress.   Skin:     Findings: Erythema present.             Comments: Poorly visualized    Neurological:      Mental Status: She is alert and oriented to person, place, and time.         Assessment:       1. Rash        Plan:   Rash  -no able to visualize area.  Poor visualization due to lightening and virtual.  Based off patient's description seems consistent with shingles.  Advised face to face visit due to poor visualization     No follow-ups on file.

## 2022-03-07 ENCOUNTER — OFFICE VISIT (OUTPATIENT)
Dept: FAMILY MEDICINE | Facility: CLINIC | Age: 52
End: 2022-03-07
Payer: COMMERCIAL

## 2022-03-07 VITALS
TEMPERATURE: 99 F | BODY MASS INDEX: 25.46 KG/M2 | HEIGHT: 68 IN | WEIGHT: 168 LBS | HEART RATE: 65 BPM | DIASTOLIC BLOOD PRESSURE: 81 MMHG | SYSTOLIC BLOOD PRESSURE: 122 MMHG

## 2022-03-07 DIAGNOSIS — Z23 IMMUNIZATION DUE: ICD-10-CM

## 2022-03-07 DIAGNOSIS — L25.9 CONTACT DERMATITIS, UNSPECIFIED CONTACT DERMATITIS TYPE, UNSPECIFIED TRIGGER: Primary | ICD-10-CM

## 2022-03-07 PROCEDURE — 3008F BODY MASS INDEX DOCD: CPT | Mod: CPTII,S$GLB,, | Performed by: FAMILY MEDICINE

## 2022-03-07 PROCEDURE — 99214 PR OFFICE/OUTPT VISIT, EST, LEVL IV, 30-39 MIN: ICD-10-PCS | Mod: S$GLB,,, | Performed by: FAMILY MEDICINE

## 2022-03-07 PROCEDURE — 1159F PR MEDICATION LIST DOCUMENTED IN MEDICAL RECORD: ICD-10-PCS | Mod: CPTII,S$GLB,, | Performed by: FAMILY MEDICINE

## 2022-03-07 PROCEDURE — 3008F PR BODY MASS INDEX (BMI) DOCUMENTED: ICD-10-PCS | Mod: CPTII,S$GLB,, | Performed by: FAMILY MEDICINE

## 2022-03-07 PROCEDURE — 3079F DIAST BP 80-89 MM HG: CPT | Mod: CPTII,S$GLB,, | Performed by: FAMILY MEDICINE

## 2022-03-07 PROCEDURE — 1159F MED LIST DOCD IN RCRD: CPT | Mod: CPTII,S$GLB,, | Performed by: FAMILY MEDICINE

## 2022-03-07 PROCEDURE — 99999 PR PBB SHADOW E&M-EST. PATIENT-LVL III: ICD-10-PCS | Mod: PBBFAC,,, | Performed by: FAMILY MEDICINE

## 2022-03-07 PROCEDURE — 99999 PR PBB SHADOW E&M-EST. PATIENT-LVL III: CPT | Mod: PBBFAC,,, | Performed by: FAMILY MEDICINE

## 2022-03-07 PROCEDURE — 99214 OFFICE O/P EST MOD 30 MIN: CPT | Mod: S$GLB,,, | Performed by: FAMILY MEDICINE

## 2022-03-07 PROCEDURE — 3074F PR MOST RECENT SYSTOLIC BLOOD PRESSURE < 130 MM HG: ICD-10-PCS | Mod: CPTII,S$GLB,, | Performed by: FAMILY MEDICINE

## 2022-03-07 PROCEDURE — 3074F SYST BP LT 130 MM HG: CPT | Mod: CPTII,S$GLB,, | Performed by: FAMILY MEDICINE

## 2022-03-07 PROCEDURE — 3079F PR MOST RECENT DIASTOLIC BLOOD PRESSURE 80-89 MM HG: ICD-10-PCS | Mod: CPTII,S$GLB,, | Performed by: FAMILY MEDICINE

## 2022-03-07 RX ORDER — PREDNISONE 5 MG/1
TABLET ORAL
Qty: 45 TABLET | Refills: 0 | Status: SHIPPED | OUTPATIENT
Start: 2022-03-07 | End: 2022-03-28

## 2022-03-07 NOTE — PROGRESS NOTES
Subjective:      Patient ID: Sabrina Collins is a 51 y.o. female.    Chief Complaint: Rash    She has had a rash on her upper torso and arms for the last week. She has had a steroid cream and it got worse. She has not had fever.  She has eczema and is somewhat like that but it hurts more now and has some puritis.  It did get blisters to start with.  It is bilateral.  Nothing makes it worse or better.  No new soaps or meds.  Stress is high.    The patient's Health Maintenance was reviewed and the following appears to be due at this time:   Health Maintenance Due   Topic Date Due    Hepatitis C Screening  Never done    HIV Screening  Never done    Shingles Vaccine (1 of 2) Never done        Past Medical History:  Past Medical History:   Diagnosis Date    HSV (herpes simplex virus) anogenital infection     Hyperkalemia 3/13/2018     Past Surgical History:   Procedure Laterality Date    ADENOIDECTOMY      APPENDECTOMY      COLONOSCOPY  07/21/2016    negative -repeat 5 yrs per Dr Ken Villarreal    TONSILLECTOMY       Review of patient's allergies indicates:   Allergen Reactions    Tetanus vaccines and toxoid      Current Outpatient Medications on File Prior to Visit   Medication Sig Dispense Refill    albuterol (PROVENTIL/VENTOLIN HFA) 90 mcg/actuation inhaler Inhale 2 puffs into the lungs every 6 (six) hours as needed for Shortness of Breath. 18 g 0    beclomethasone (QVAR) 40 mcg/actuation Aero Inhale 1 puff into the lungs 2 (two) times daily. Controller 8.7 g 0    blood pressure monitor Kit 1 kit by Misc.(Non-Drug; Combo Route) route once daily. 1 each 0    butalbital-acetaminophen-caffeine -40 mg (FIORICET, ESGIC) -40 mg per tablet Take 1 tablet by mouth every 4 to 6 hours as needed for Headaches. 45 tablet 0    clobetasol (TEMOVATE) 0.05 % cream Apply topically 2 (two) times daily.      cyclobenzaprine (FLEXERIL) 10 MG tablet Take 10 mg by mouth daily as needed.      dicyclomine (BENTYL)  10 MG capsule Take 10 mg by mouth daily as needed.       hyoscyamine (LEVSIN) 0.125 mg TbDL Take 0.125 mg by mouth every 4 (four) hours as needed for Cramping.      methocarbamoL (ROBAXIN) 750 MG Tab Take 1 tablet (750 mg total) by mouth 2 (two) times daily as needed. 60 tablet 0    metoprolol succinate (TOPROL-XL) 25 MG 24 hr tablet Take 1 tablet (25 mg total) by mouth once daily. 90 tablet 0    mupirocin (BACTROBAN) 2 % ointment Apply topically 3 (three) times daily. 30 g 0    valACYclovir (VALTREX) 1000 MG tablet Take 1 tablet (1,000 mg total) by mouth once daily. Take 2 tablets q 12 hours at onset for two doses 30 tablet 0    [DISCONTINUED] naproxen sodium (ANAPROX) 220 MG tablet Take 220 mg by mouth 2 (two) times daily as needed.      [DISCONTINUED] omeprazole 20 mg TbEC Take 20 mg by mouth 2 (two) times a day. (Patient not taking: No sig reported) 60 each 6     No current facility-administered medications on file prior to visit.     Social History     Socioeconomic History    Marital status:     Number of children: 2   Occupational History    Occupation: teacher      Employer: [employer]     Comment: [employer]   Tobacco Use    Smoking status: Current Every Day Smoker     Packs/day: 0.30     Types: Cigarettes    Smokeless tobacco: Never Used   Substance and Sexual Activity    Alcohol use: No     Alcohol/week: 0.0 standard drinks     Comment: sober since 9/5/2009    Drug use: No    Sexual activity: Yes     Partners: Male     Social Determinants of Health     Financial Resource Strain: Low Risk     Difficulty of Paying Living Expenses: Not very hard   Food Insecurity: Food Insecurity Present    Worried About Running Out of Food in the Last Year: Sometimes true    Ran Out of Food in the Last Year: Sometimes true   Transportation Needs: No Transportation Needs    Lack of Transportation (Medical): No    Lack of Transportation (Non-Medical): No   Physical Activity:  "Insufficiently Active    Days of Exercise per Week: 3 days    Minutes of Exercise per Session: 20 min   Stress: Stress Concern Present    Feeling of Stress : To some extent   Social Connections: Unknown    Frequency of Communication with Friends and Family: More than three times a week    Frequency of Social Gatherings with Friends and Family: Once a week    Active Member of Clubs or Organizations: Yes    Attends Club or Organization Meetings: More than 4 times per year    Marital Status:    Housing Stability: Low Risk     Unable to Pay for Housing in the Last Year: No    Number of Places Lived in the Last Year: 2    Unstable Housing in the Last Year: No     Family History   Problem Relation Age of Onset    Heart disease Mother 35        MI x 2    Cancer Mother         breast,     Heart disease Maternal Grandmother     Cancer Maternal Grandmother         colon CA, lung ca (not metastatic)    Diabetes Maternal Grandmother     Heart disease Paternal Grandfather     Cancer Paternal Aunt         colon ca     Cancer Maternal Grandfather         melanoma, colon ca    Cancer Paternal Grandmother         colon ca       Review of Systems   Constitutional: Negative for fever.   Respiratory: Negative for cough and shortness of breath.    Cardiovascular: Negative for chest pain.   Skin: Positive for rash.   Psychiatric/Behavioral: Positive for agitation and dysphoric mood.       Objective:   /81   Pulse 65   Temp 98.5 °F (36.9 °C)   Ht 5' 8" (1.727 m)   Wt 76.2 kg (168 lb)   BMI 25.54 kg/m²     Physical Exam  Constitutional:       General: She is not in acute distress.     Appearance: She is well-developed. She is not diaphoretic.   Pulmonary:      Effort: Pulmonary effort is normal. No respiratory distress.   Skin:     Comments: She has excoriations noted on her arms and upper back and chest with some light scaling and is not bullous.     Neurological:      Mental Status: She is alert and " oriented to person, place, and time.   Psychiatric:         Behavior: Behavior normal.         Thought Content: Thought content normal.         Judgment: Judgment normal.       Assessment:     1. Contact dermatitis, unspecified contact dermatitis type, unspecified trigger    2. Immunization due      Plan:   I am having Sabrina Collins start on predniSONE. I am also having her maintain her dicyclomine, hyoscyamine, clobetasoL, mupirocin, valACYclovir, albuterol, beclomethasone, methocarbamoL, butalbital-acetaminophen-caffeine -40 mg, blood pressure monitor, metoprolol succinate, and cyclobenzaprine.  Problem List Items Addressed This Visit    None     Visit Diagnoses     Contact dermatitis, unspecified contact dermatitis type, unspecified trigger    -  Primary    Relevant Medications    predniSONE (DELTASONE) 5 MG tablet    Immunization due        Relevant Orders    Zoster Recombinant Vaccine    Zoster Recombinant Vaccine        Follow up for Arrange immunization(s) orderedf for when she wants to have them in ancillary.    Sabrina was seen today for rash.    Diagnoses and all orders for this visit:    Contact dermatitis, unspecified contact dermatitis type, unspecified trigger  -     predniSONE (DELTASONE) 5 MG tablet; Take 8 po q day x 3 days then 4 po q day x 3 days then 2 po q day x 3 days then 1 po q day x 3 days then stop.    Immunization due  -     Zoster Recombinant Vaccine; Future  -     Zoster Recombinant Vaccine; Future      Medications Ordered This Encounter   Medications    predniSONE (DELTASONE) 5 MG tablet     Sig: Take 8 po q day x 3 days then 4 po q day x 3 days then 2 po q day x 3 days then 1 po q day x 3 days then stop.     Dispense:  45 tablet     Refill:  0     The patient was instructed to stop the following meds:  Medications Discontinued During This Encounter   Medication Reason    omeprazole 20 mg TbEC Patient no longer taking    naproxen sodium (ANAPROX) 220 MG tablet Patient no  longer taking     Orders Placed This Encounter   Procedures    Zoster Recombinant Vaccine     Standing Status:   Future     Standing Expiration Date:   9/7/2022    Zoster Recombinant Vaccine     Standing Status:   Future     Standing Expiration Date:   4/7/2022       Medication List with Changes/Refills   New Medications    PREDNISONE (DELTASONE) 5 MG TABLET    Take 8 po q day x 3 days then 4 po q day x 3 days then 2 po q day x 3 days then 1 po q day x 3 days then stop.   Current Medications    ALBUTEROL (PROVENTIL/VENTOLIN HFA) 90 MCG/ACTUATION INHALER    Inhale 2 puffs into the lungs every 6 (six) hours as needed for Shortness of Breath.    BECLOMETHASONE (QVAR) 40 MCG/ACTUATION AERO    Inhale 1 puff into the lungs 2 (two) times daily. Controller    BLOOD PRESSURE MONITOR KIT    1 kit by Misc.(Non-Drug; Combo Route) route once daily.    BUTALBITAL-ACETAMINOPHEN-CAFFEINE -40 MG (FIORICET, ESGIC) -40 MG PER TABLET    Take 1 tablet by mouth every 4 to 6 hours as needed for Headaches.    CLOBETASOL (TEMOVATE) 0.05 % CREAM    Apply topically 2 (two) times daily.    CYCLOBENZAPRINE (FLEXERIL) 10 MG TABLET    Take 10 mg by mouth daily as needed.    DICYCLOMINE (BENTYL) 10 MG CAPSULE    Take 10 mg by mouth daily as needed.     HYOSCYAMINE (LEVSIN) 0.125 MG TBDL    Take 0.125 mg by mouth every 4 (four) hours as needed for Cramping.    METHOCARBAMOL (ROBAXIN) 750 MG TAB    Take 1 tablet (750 mg total) by mouth 2 (two) times daily as needed.    METOPROLOL SUCCINATE (TOPROL-XL) 25 MG 24 HR TABLET    Take 1 tablet (25 mg total) by mouth once daily.    MUPIROCIN (BACTROBAN) 2 % OINTMENT    Apply topically 3 (three) times daily.    VALACYCLOVIR (VALTREX) 1000 MG TABLET    Take 1 tablet (1,000 mg total) by mouth once daily. Take 2 tablets q 12 hours at onset for two doses   Discontinued Medications    NAPROXEN SODIUM (ANAPROX) 220 MG TABLET    Take 220 mg by mouth 2 (two) times daily as needed.    OMEPRAZOLE 20 MG  TBEC    Take 20 mg by mouth 2 (two) times a day.      Medication List with Changes/Refills   New Medications    PREDNISONE (DELTASONE) 5 MG TABLET    Take 8 po q day x 3 days then 4 po q day x 3 days then 2 po q day x 3 days then 1 po q day x 3 days then stop.       Start Date: 3/7/2022  End Date: --   Current Medications    ALBUTEROL (PROVENTIL/VENTOLIN HFA) 90 MCG/ACTUATION INHALER    Inhale 2 puffs into the lungs every 6 (six) hours as needed for Shortness of Breath.       Start Date: 8/3/2020  End Date: --    BECLOMETHASONE (QVAR) 40 MCG/ACTUATION AERO    Inhale 1 puff into the lungs 2 (two) times daily. Controller       Start Date: 10/19/2020End Date: --    BLOOD PRESSURE MONITOR KIT    1 kit by Misc.(Non-Drug; Combo Route) route once daily.       Start Date: 4/23/2021 End Date: --    BUTALBITAL-ACETAMINOPHEN-CAFFEINE -40 MG (FIORICET, ESGIC) -40 MG PER TABLET    Take 1 tablet by mouth every 4 to 6 hours as needed for Headaches.       Start Date: 3/3/2021  End Date: --    CLOBETASOL (TEMOVATE) 0.05 % CREAM    Apply topically 2 (two) times daily.       Start Date: --        End Date: --    CYCLOBENZAPRINE (FLEXERIL) 10 MG TABLET    Take 10 mg by mouth daily as needed.       Start Date: 7/6/2021  End Date: --    DICYCLOMINE (BENTYL) 10 MG CAPSULE    Take 10 mg by mouth daily as needed.        Start Date: --        End Date: --    HYOSCYAMINE (LEVSIN) 0.125 MG TBDL    Take 0.125 mg by mouth every 4 (four) hours as needed for Cramping.       Start Date: --        End Date: --    METHOCARBAMOL (ROBAXIN) 750 MG TAB    Take 1 tablet (750 mg total) by mouth 2 (two) times daily as needed.       Start Date: 3/2/2021  End Date: --    METOPROLOL SUCCINATE (TOPROL-XL) 25 MG 24 HR TABLET    Take 1 tablet (25 mg total) by mouth once daily.       Start Date: 9/15/2021 End Date: 9/15/2022    MUPIROCIN (BACTROBAN) 2 % OINTMENT    Apply topically 3 (three) times daily.       Start Date: 8/3/2020  End Date: --     VALACYCLOVIR (VALTREX) 1000 MG TABLET    Take 1 tablet (1,000 mg total) by mouth once daily. Take 2 tablets q 12 hours at onset for two doses       Start Date: 8/3/2020  End Date: --   Discontinued Medications    NAPROXEN SODIUM (ANAPROX) 220 MG TABLET    Take 220 mg by mouth 2 (two) times daily as needed.       Start Date: --        End Date: 3/7/2022    OMEPRAZOLE 20 MG TBEC    Take 20 mg by mouth 2 (two) times a day.       Start Date: 11/9/2021 End Date: 3/7/2022

## 2022-03-28 ENCOUNTER — OFFICE VISIT (OUTPATIENT)
Dept: FAMILY MEDICINE | Facility: CLINIC | Age: 52
End: 2022-03-28
Payer: COMMERCIAL

## 2022-03-28 VITALS
WEIGHT: 168.5 LBS | OXYGEN SATURATION: 99 % | DIASTOLIC BLOOD PRESSURE: 68 MMHG | BODY MASS INDEX: 25.54 KG/M2 | HEIGHT: 68 IN | HEART RATE: 78 BPM | SYSTOLIC BLOOD PRESSURE: 110 MMHG

## 2022-03-28 DIAGNOSIS — R21 RASH: Primary | ICD-10-CM

## 2022-03-28 DIAGNOSIS — L29.9 PRURITUS: ICD-10-CM

## 2022-03-28 PROCEDURE — 1159F PR MEDICATION LIST DOCUMENTED IN MEDICAL RECORD: ICD-10-PCS | Mod: CPTII,S$GLB,, | Performed by: NURSE PRACTITIONER

## 2022-03-28 PROCEDURE — 3074F PR MOST RECENT SYSTOLIC BLOOD PRESSURE < 130 MM HG: ICD-10-PCS | Mod: CPTII,S$GLB,, | Performed by: NURSE PRACTITIONER

## 2022-03-28 PROCEDURE — 96372 PR INJECTION,THERAP/PROPH/DIAG2ST, IM OR SUBCUT: ICD-10-PCS | Mod: S$GLB,,, | Performed by: NURSE PRACTITIONER

## 2022-03-28 PROCEDURE — 99999 PR PBB SHADOW E&M-EST. PATIENT-LVL V: ICD-10-PCS | Mod: PBBFAC,,, | Performed by: NURSE PRACTITIONER

## 2022-03-28 PROCEDURE — 3008F BODY MASS INDEX DOCD: CPT | Mod: CPTII,S$GLB,, | Performed by: NURSE PRACTITIONER

## 2022-03-28 PROCEDURE — 3008F PR BODY MASS INDEX (BMI) DOCUMENTED: ICD-10-PCS | Mod: CPTII,S$GLB,, | Performed by: NURSE PRACTITIONER

## 2022-03-28 PROCEDURE — 1159F MED LIST DOCD IN RCRD: CPT | Mod: CPTII,S$GLB,, | Performed by: NURSE PRACTITIONER

## 2022-03-28 PROCEDURE — 96372 THER/PROPH/DIAG INJ SC/IM: CPT | Mod: S$GLB,,, | Performed by: NURSE PRACTITIONER

## 2022-03-28 PROCEDURE — 99214 OFFICE O/P EST MOD 30 MIN: CPT | Mod: 25,S$GLB,, | Performed by: NURSE PRACTITIONER

## 2022-03-28 PROCEDURE — 99999 PR PBB SHADOW E&M-EST. PATIENT-LVL V: CPT | Mod: PBBFAC,,, | Performed by: NURSE PRACTITIONER

## 2022-03-28 PROCEDURE — 3074F SYST BP LT 130 MM HG: CPT | Mod: CPTII,S$GLB,, | Performed by: NURSE PRACTITIONER

## 2022-03-28 PROCEDURE — 3078F DIAST BP <80 MM HG: CPT | Mod: CPTII,S$GLB,, | Performed by: NURSE PRACTITIONER

## 2022-03-28 PROCEDURE — 99214 PR OFFICE/OUTPT VISIT, EST, LEVL IV, 30-39 MIN: ICD-10-PCS | Mod: 25,S$GLB,, | Performed by: NURSE PRACTITIONER

## 2022-03-28 PROCEDURE — 3078F PR MOST RECENT DIASTOLIC BLOOD PRESSURE < 80 MM HG: ICD-10-PCS | Mod: CPTII,S$GLB,, | Performed by: NURSE PRACTITIONER

## 2022-03-28 RX ORDER — HYDROXYZINE HYDROCHLORIDE 25 MG/1
25 TABLET, FILM COATED ORAL 3 TIMES DAILY PRN
Qty: 30 TABLET | Refills: 0 | Status: SHIPPED | OUTPATIENT
Start: 2022-03-28

## 2022-03-28 RX ORDER — PANTOPRAZOLE SODIUM 40 MG/1
40 TABLET, DELAYED RELEASE ORAL DAILY
COMMUNITY
Start: 2021-11-11

## 2022-03-28 RX ORDER — DEXAMETHASONE SODIUM PHOSPHATE 4 MG/ML
8 INJECTION, SOLUTION INTRA-ARTICULAR; INTRALESIONAL; INTRAMUSCULAR; INTRAVENOUS; SOFT TISSUE ONCE
Status: COMPLETED | OUTPATIENT
Start: 2022-03-28 | End: 2022-03-28

## 2022-03-28 RX ORDER — CLOTRIMAZOLE AND BETAMETHASONE DIPROPIONATE 10; .64 MG/G; MG/G
CREAM TOPICAL 2 TIMES DAILY
Qty: 45 G | Refills: 0 | Status: SHIPPED | OUTPATIENT
Start: 2022-03-28

## 2022-03-28 RX ORDER — TRIAMCINOLONE ACETONIDE 1 MG/G
OINTMENT TOPICAL 3 TIMES DAILY
Qty: 30 G | Refills: 0 | Status: SHIPPED | OUTPATIENT
Start: 2022-03-28 | End: 2022-03-28 | Stop reason: ALTCHOICE

## 2022-03-28 RX ORDER — SODIUM PICOSULFATE, MAGNESIUM OXIDE, AND ANHYDROUS CITRIC ACID 10; 3.5; 12 MG/160ML; G/160ML; G/160ML
LIQUID ORAL
COMMUNITY
Start: 2021-11-11

## 2022-03-28 RX ORDER — FLUCONAZOLE 150 MG/1
150 TABLET ORAL
Qty: 3 TABLET | Refills: 0 | Status: SHIPPED | OUTPATIENT
Start: 2022-03-28 | End: 2022-04-04

## 2022-03-28 RX ADMIN — DEXAMETHASONE SODIUM PHOSPHATE 8 MG: 4 INJECTION, SOLUTION INTRA-ARTICULAR; INTRALESIONAL; INTRAMUSCULAR; INTRAVENOUS; SOFT TISSUE at 09:03

## 2022-03-28 NOTE — PROGRESS NOTES
Assessment/Plan:  Problem List Items Addressed This Visit    None     Visit Diagnoses     Rash    -  Primary    Relevant Medications    dexamethasone injection 8 mg (Completed)    fluconazole (DIFLUCAN) 150 MG Tab    clotrimazole-betamethasone 1-0.05% (LOTRISONE) cream    Other Relevant Orders    Ambulatory referral/consult to Dermatology    Pruritus        Relevant Medications    hydrOXYzine HCL (ATARAX) 25 MG tablet        Follow up in about 1 week (around 4/4/2022), or if symptoms worsen or fail to improve, for Follow up with PCP.    Gisela Figueroa, DAVID  _____________________________________________________________________________________________________________________________________________________    CC: rash    HPI: Patient is a 51-year-old female who presents in clinic today as an established patient here for rash. This is an ongoing problem for the last 3 weeks. She was seen in clinic on 3/7/22 for the same problem. Rash is described as itchy and painful. First started to right shoulder and has progressively spread. Widespread. She was treated with oral prednisone. She completed medication as prescribed and reports minimal improvement. Denies: abdominal pain, arthralgia, congestion, decrease in appetite, decrease in energy level, fever, headache, irritability, myalgia, nausea, sore throat and vomiting. Patient has not had contacts with similar rash. Patient has not identified precipitant. Patient has not had new exposures (soaps, lotions, laundry detergents, foods, medications, plants, insects or animals.)    Past Medical History:  Past Medical History:   Diagnosis Date    HSV (herpes simplex virus) anogenital infection     Hyperkalemia 3/13/2018     Past Surgical History:   Procedure Laterality Date    ADENOIDECTOMY      APPENDECTOMY      COLONOSCOPY  07/21/2016    negative -repeat 5 yrs per Dr Ken Villarreal    TONSILLECTOMY       Review of patient's allergies indicates:   Allergen Reactions     Tetanus vaccines and toxoid      Social History     Tobacco Use    Smoking status: Current Every Day Smoker     Packs/day: 0.30     Types: Cigarettes    Smokeless tobacco: Never Used   Substance Use Topics    Alcohol use: No     Alcohol/week: 0.0 standard drinks     Comment: sober since 9/5/2009    Drug use: No     Family History   Problem Relation Age of Onset    Heart disease Mother 35        MI x 2    Cancer Mother         breast,     Heart disease Maternal Grandmother     Cancer Maternal Grandmother         colon CA, lung ca (not metastatic)    Diabetes Maternal Grandmother     Heart disease Paternal Grandfather     Cancer Paternal Aunt         colon ca     Cancer Maternal Grandfather         melanoma, colon ca    Cancer Paternal Grandmother         colon ca     Current Outpatient Medications on File Prior to Visit   Medication Sig Dispense Refill    albuterol (PROVENTIL/VENTOLIN HFA) 90 mcg/actuation inhaler Inhale 2 puffs into the lungs every 6 (six) hours as needed for Shortness of Breath. 18 g 0    beclomethasone (QVAR) 40 mcg/actuation Aero Inhale 1 puff into the lungs 2 (two) times daily. Controller 8.7 g 0    blood pressure monitor Kit 1 kit by Misc.(Non-Drug; Combo Route) route once daily. 1 each 0    butalbital-acetaminophen-caffeine -40 mg (FIORICET, ESGIC) -40 mg per tablet Take 1 tablet by mouth every 4 to 6 hours as needed for Headaches. 45 tablet 0    clobetasol (TEMOVATE) 0.05 % cream Apply topically 2 (two) times daily.      cyclobenzaprine (FLEXERIL) 10 MG tablet Take 10 mg by mouth daily as needed.      dicyclomine (BENTYL) 10 MG capsule Take 10 mg by mouth daily as needed.       hyoscyamine (LEVSIN) 0.125 mg TbDL Take 0.125 mg by mouth every 4 (four) hours as needed for Cramping.      methocarbamoL (ROBAXIN) 750 MG Tab Take 1 tablet (750 mg total) by mouth 2 (two) times daily as needed. 60 tablet 0    metoprolol succinate (TOPROL-XL) 25 MG 24 hr tablet Take 1  "tablet (25 mg total) by mouth once daily. 90 tablet 0    mupirocin (BACTROBAN) 2 % ointment Apply topically 3 (three) times daily. 30 g 0    pantoprazole (PROTONIX) 40 MG tablet Take 40 mg by mouth once daily.      valACYclovir (VALTREX) 1000 MG tablet Take 1 tablet (1,000 mg total) by mouth once daily. Take 2 tablets q 12 hours at onset for two doses 30 tablet 0    CLENPIQ 10 mg-3.5 gram -12 gram/160 mL Soln SMARTSI By Mouth As Directed      [DISCONTINUED] predniSONE (DELTASONE) 5 MG tablet Take 8 po q day x 3 days then 4 po q day x 3 days then 2 po q day x 3 days then 1 po q day x 3 days then stop. (Patient not taking: Reported on 3/28/2022) 45 tablet 0     No current facility-administered medications on file prior to visit.     Review of Systems   Constitutional: Negative for appetite change, chills, fatigue and fever.   HENT: Negative for congestion, rhinorrhea and sore throat.    Eyes: Negative for visual disturbance.   Respiratory: Negative for cough and shortness of breath.    Cardiovascular: Negative for chest pain, palpitations and leg swelling.   Gastrointestinal: Negative for abdominal pain, diarrhea and vomiting.   Genitourinary: Negative for difficulty urinating, dysuria and hematuria.   Musculoskeletal: Negative for arthralgias and myalgias.   Skin: Positive for rash. Negative for wound.   Neurological: Negative for dizziness and headaches.   Psychiatric/Behavioral: Negative for behavioral problems. The patient is not nervous/anxious.      Vitals:    22 0857   BP: 110/68   BP Location: Right arm   Pulse: 78   SpO2: 99%   Weight: 76.4 kg (168 lb 8 oz)   Height: 5' 8" (1.727 m)     Wt Readings from Last 3 Encounters:   22 76.4 kg (168 lb 8 oz)   22 76.2 kg (168 lb)   22 77.3 kg (170 lb 6.7 oz)     Physical Exam  Vitals reviewed.   Constitutional:       General: She is not in acute distress.     Appearance: Normal appearance. She is well-developed. She is not ill-appearing " or diaphoretic.   HENT:      Head: Normocephalic and atraumatic.      Right Ear: External ear normal.      Left Ear: External ear normal.   Eyes:      Extraocular Movements: Extraocular movements intact.      Conjunctiva/sclera: Conjunctivae normal.   Cardiovascular:      Rate and Rhythm: Normal rate.      Heart sounds: Normal heart sounds.   Pulmonary:      Effort: Pulmonary effort is normal. No respiratory distress.      Breath sounds: Normal breath sounds. No wheezing.   Abdominal:      General: Abdomen is flat. There is no distension.   Musculoskeletal:         General: Normal range of motion.      Cervical back: Normal range of motion.   Skin:     General: Skin is warm and dry.      Capillary Refill: Capillary refill takes less than 2 seconds.      Coloration: Skin is not pale.      Findings: Erythema and rash present.      Comments: See photos below    Neurological:      Mental Status: She is alert and oriented to person, place, and time. Mental status is at baseline.   Psychiatric:         Mood and Affect: Mood normal.         Speech: Speech normal.         Behavior: Behavior normal. Behavior is cooperative.         Thought Content: Thought content normal.         Judgment: Judgment normal.                 Health Maintenance   Topic Date Due    Hepatitis C Screening  Never done    Mammogram  07/28/2022    Lipid Panel  11/09/2022    TETANUS VACCINE  Discontinued

## 2022-04-19 DIAGNOSIS — R10.9 ABDOMINAL PAIN, UNSPECIFIED ABDOMINAL LOCATION: Primary | ICD-10-CM

## 2022-04-19 DIAGNOSIS — R19.5 DARK STOOLS: ICD-10-CM

## 2022-04-22 ENCOUNTER — OFFICE VISIT (OUTPATIENT)
Dept: CARDIOLOGY | Facility: CLINIC | Age: 52
End: 2022-04-22
Payer: COMMERCIAL

## 2022-04-22 VITALS
OXYGEN SATURATION: 99 % | SYSTOLIC BLOOD PRESSURE: 112 MMHG | DIASTOLIC BLOOD PRESSURE: 74 MMHG | WEIGHT: 168 LBS | BODY MASS INDEX: 25.54 KG/M2 | HEART RATE: 74 BPM

## 2022-04-22 DIAGNOSIS — I10 PRIMARY HYPERTENSION: ICD-10-CM

## 2022-04-22 DIAGNOSIS — F17.200 SMOKER: ICD-10-CM

## 2022-04-22 DIAGNOSIS — R07.9 CHEST PAIN, MODERATE CORONARY ARTERY RISK: Primary | ICD-10-CM

## 2022-04-22 DIAGNOSIS — E78.00 PURE HYPERCHOLESTEROLEMIA: ICD-10-CM

## 2022-04-22 PROCEDURE — 3078F PR MOST RECENT DIASTOLIC BLOOD PRESSURE < 80 MM HG: ICD-10-PCS | Mod: CPTII,S$GLB,, | Performed by: INTERNAL MEDICINE

## 2022-04-22 PROCEDURE — 99999 PR PBB SHADOW E&M-EST. PATIENT-LVL IV: CPT | Mod: PBBFAC,,, | Performed by: INTERNAL MEDICINE

## 2022-04-22 PROCEDURE — 1159F PR MEDICATION LIST DOCUMENTED IN MEDICAL RECORD: ICD-10-PCS | Mod: CPTII,S$GLB,, | Performed by: INTERNAL MEDICINE

## 2022-04-22 PROCEDURE — 1159F MED LIST DOCD IN RCRD: CPT | Mod: CPTII,S$GLB,, | Performed by: INTERNAL MEDICINE

## 2022-04-22 PROCEDURE — 3008F PR BODY MASS INDEX (BMI) DOCUMENTED: ICD-10-PCS | Mod: CPTII,S$GLB,, | Performed by: INTERNAL MEDICINE

## 2022-04-22 PROCEDURE — 99214 OFFICE O/P EST MOD 30 MIN: CPT | Mod: S$GLB,,, | Performed by: INTERNAL MEDICINE

## 2022-04-22 PROCEDURE — 3078F DIAST BP <80 MM HG: CPT | Mod: CPTII,S$GLB,, | Performed by: INTERNAL MEDICINE

## 2022-04-22 PROCEDURE — 99214 PR OFFICE/OUTPT VISIT, EST, LEVL IV, 30-39 MIN: ICD-10-PCS | Mod: S$GLB,,, | Performed by: INTERNAL MEDICINE

## 2022-04-22 PROCEDURE — 3008F BODY MASS INDEX DOCD: CPT | Mod: CPTII,S$GLB,, | Performed by: INTERNAL MEDICINE

## 2022-04-22 PROCEDURE — 3074F SYST BP LT 130 MM HG: CPT | Mod: CPTII,S$GLB,, | Performed by: INTERNAL MEDICINE

## 2022-04-22 PROCEDURE — 99999 PR PBB SHADOW E&M-EST. PATIENT-LVL IV: ICD-10-PCS | Mod: PBBFAC,,, | Performed by: INTERNAL MEDICINE

## 2022-04-22 PROCEDURE — 3074F PR MOST RECENT SYSTOLIC BLOOD PRESSURE < 130 MM HG: ICD-10-PCS | Mod: CPTII,S$GLB,, | Performed by: INTERNAL MEDICINE

## 2022-04-22 NOTE — PROGRESS NOTES
Subjective:   Patient ID:  Sabrina Collins is a 51 y.o. female who presents for follow-up of No chief complaint on file.  NMT normal. Patient denies CP, angina or anginal equivalent.    Chest Pain   This is a new problem. The current episode started 1 to 4 weeks ago. The problem occurs intermittently. The problem has been waxing and waning. The pain is present in the substernal region. The pain is mild. The quality of the pain is described as dull. The pain does not radiate. Pertinent negatives include no dizziness, palpitations or shortness of breath. The pain is aggravated by nothing. She has tried rest for the symptoms. The treatment provided mild relief.   Her past medical history is significant for hyperlipidemia and hypertension.   Pertinent negatives for past medical history include no muscle weakness.   Hypertension  This is a chronic problem. The current episode started more than 1 year ago. The problem has been gradually improving since onset. The problem is controlled. Pertinent negatives include no chest pain, palpitations or shortness of breath. Past treatments include beta blockers. The current treatment provides moderate improvement. There are no compliance problems.    Hyperlipidemia  This is a chronic problem. The current episode started more than 1 year ago. Pertinent negatives include no chest pain or shortness of breath. She is currently on no antihyperlipidemic treatment. The current treatment provides mild improvement of lipids. Risk factors for coronary artery disease include hypertension and dyslipidemia.       Review of Systems   Constitutional: Negative. Negative for weight gain.   HENT: Negative.    Eyes: Negative.    Cardiovascular: Negative.  Negative for chest pain, leg swelling and palpitations.   Respiratory: Negative.  Negative for shortness of breath.    Endocrine: Negative.    Hematologic/Lymphatic: Negative.    Skin: Negative.    Musculoskeletal: Negative for muscle weakness.    Gastrointestinal: Negative.    Genitourinary: Negative.    Neurological: Negative.  Negative for dizziness.   Psychiatric/Behavioral: Negative.    Allergic/Immunologic: Negative.      Family History   Problem Relation Age of Onset    Heart disease Mother 35        MI x 2    Cancer Mother         breast,     Heart disease Maternal Grandmother     Cancer Maternal Grandmother         colon CA, lung ca (not metastatic)    Diabetes Maternal Grandmother     Heart disease Paternal Grandfather     Cancer Paternal Aunt         colon ca     Cancer Maternal Grandfather         melanoma, colon ca    Cancer Paternal Grandmother         colon ca     Past Medical History:   Diagnosis Date    HSV (herpes simplex virus) anogenital infection     Hyperkalemia 3/13/2018     Social History     Socioeconomic History    Marital status:     Number of children: 2   Occupational History    Occupation: teacher      Employer: [employer]     Comment: [employer]   Tobacco Use    Smoking status: Current Every Day Smoker     Packs/day: 0.30     Types: Cigarettes    Smokeless tobacco: Never Used   Substance and Sexual Activity    Alcohol use: No     Alcohol/week: 0.0 standard drinks     Comment: sober since 9/5/2009    Drug use: No    Sexual activity: Yes     Partners: Male     Social Determinants of Health     Financial Resource Strain: Low Risk     Difficulty of Paying Living Expenses: Not very hard   Food Insecurity: No Food Insecurity    Worried About Running Out of Food in the Last Year: Never true    Ran Out of Food in the Last Year: Never true   Transportation Needs: No Transportation Needs    Lack of Transportation (Medical): No    Lack of Transportation (Non-Medical): No   Physical Activity: Insufficiently Active    Days of Exercise per Week: 2 days    Minutes of Exercise per Session: 20 min   Stress: Stress Concern Present    Feeling of Stress : Rather much   Social Connections: Unknown     Frequency of Communication with Friends and Family: More than three times a week    Frequency of Social Gatherings with Friends and Family: Once a week    Active Member of Clubs or Organizations: Yes    Attends Club or Organization Meetings: More than 4 times per year    Marital Status:    Housing Stability: Low Risk     Unable to Pay for Housing in the Last Year: No    Number of Places Lived in the Last Year: 2    Unstable Housing in the Last Year: No     Current Outpatient Medications on File Prior to Visit   Medication Sig Dispense Refill    albuterol (PROVENTIL/VENTOLIN HFA) 90 mcg/actuation inhaler Inhale 2 puffs into the lungs every 6 (six) hours as needed for Shortness of Breath. 18 g 0    beclomethasone (QVAR) 40 mcg/actuation Aero Inhale 1 puff into the lungs 2 (two) times daily. Controller 8.7 g 0    blood pressure monitor Kit 1 kit by Misc.(Non-Drug; Combo Route) route once daily. 1 each 0    butalbital-acetaminophen-caffeine -40 mg (FIORICET, ESGIC) -40 mg per tablet Take 1 tablet by mouth every 4 to 6 hours as needed for Headaches. 45 tablet 0    CLENPIQ 10 mg-3.5 gram -12 gram/160 mL Soln SMARTSI By Mouth As Directed      clobetasol (TEMOVATE) 0.05 % cream Apply topically 2 (two) times daily.      clotrimazole-betamethasone 1-0.05% (LOTRISONE) cream Apply topically 2 (two) times daily. 45 g 0    cyclobenzaprine (FLEXERIL) 10 MG tablet Take 10 mg by mouth daily as needed.      dicyclomine (BENTYL) 10 MG capsule Take 10 mg by mouth daily as needed.       hydrOXYzine HCL (ATARAX) 25 MG tablet Take 1 tablet (25 mg total) by mouth 3 (three) times daily as needed for Itching. 30 tablet 0    hyoscyamine (LEVSIN) 0.125 mg TbDL Take 0.125 mg by mouth every 4 (four) hours as needed for Cramping.      methocarbamoL (ROBAXIN) 750 MG Tab Take 1 tablet (750 mg total) by mouth 2 (two) times daily as needed. 60 tablet 0    metoprolol succinate (TOPROL-XL) 25 MG 24 hr tablet  Take 1 tablet (25 mg total) by mouth once daily. 90 tablet 0    mupirocin (BACTROBAN) 2 % ointment Apply topically 3 (three) times daily. 30 g 0    pantoprazole (PROTONIX) 40 MG tablet Take 40 mg by mouth once daily.      valACYclovir (VALTREX) 1000 MG tablet Take 1 tablet (1,000 mg total) by mouth once daily. Take 2 tablets q 12 hours at onset for two doses 30 tablet 0     No current facility-administered medications on file prior to visit.     Review of patient's allergies indicates:   Allergen Reactions    Tetanus vaccines and toxoid        Objective:     Physical Exam  Vitals and nursing note reviewed.   Constitutional:       Appearance: She is well-developed.   HENT:      Head: Normocephalic and atraumatic.   Eyes:      Conjunctiva/sclera: Conjunctivae normal.      Pupils: Pupils are equal, round, and reactive to light.   Cardiovascular:      Rate and Rhythm: Normal rate and regular rhythm.      Pulses: Intact distal pulses.      Heart sounds: Normal heart sounds.   Pulmonary:      Effort: Pulmonary effort is normal.      Breath sounds: Normal breath sounds.   Abdominal:      General: Bowel sounds are normal.      Palpations: Abdomen is soft.   Musculoskeletal:         General: Normal range of motion.      Cervical back: Normal range of motion and neck supple.   Skin:     General: Skin is warm and dry.   Neurological:      Mental Status: She is alert and oriented to person, place, and time.         Assessment:     1. Chest pain, moderate coronary artery risk    2. Primary hypertension    3. Pure hypercholesterolemia    4. Smoker        Plan:     Chest pain, moderate coronary artery risk    Primary hypertension    Pure hypercholesterolemia    Smoker        Continue metoprolol-htn  Mediterranean diet  Smoking cessation

## 2022-05-13 ENCOUNTER — PATIENT MESSAGE (OUTPATIENT)
Dept: SMOKING CESSATION | Facility: CLINIC | Age: 52
End: 2022-05-13
Payer: COMMERCIAL

## 2022-05-26 ENCOUNTER — PATIENT MESSAGE (OUTPATIENT)
Dept: FAMILY MEDICINE | Facility: CLINIC | Age: 52
End: 2022-05-26
Payer: COMMERCIAL

## 2022-07-08 ENCOUNTER — PATIENT MESSAGE (OUTPATIENT)
Dept: FAMILY MEDICINE | Facility: CLINIC | Age: 52
End: 2022-07-08
Payer: COMMERCIAL

## 2022-07-20 LAB — PAP RECOMMENDATION EXT: NORMAL

## 2022-08-03 DIAGNOSIS — Z12.31 OTHER SCREENING MAMMOGRAM: ICD-10-CM

## 2022-08-09 ENCOUNTER — PATIENT MESSAGE (OUTPATIENT)
Dept: ADMINISTRATIVE | Facility: HOSPITAL | Age: 52
End: 2022-08-09
Payer: COMMERCIAL

## 2022-10-04 ENCOUNTER — PATIENT MESSAGE (OUTPATIENT)
Dept: ADMINISTRATIVE | Facility: HOSPITAL | Age: 52
End: 2022-10-04

## 2022-11-16 ENCOUNTER — PATIENT MESSAGE (OUTPATIENT)
Dept: CARDIOLOGY | Facility: CLINIC | Age: 52
End: 2022-11-16

## 2022-11-16 DIAGNOSIS — I10 PRIMARY HYPERTENSION: ICD-10-CM

## 2022-11-22 ENCOUNTER — PATIENT MESSAGE (OUTPATIENT)
Dept: CARDIOLOGY | Facility: CLINIC | Age: 52
End: 2022-11-22

## 2022-11-29 ENCOUNTER — PATIENT MESSAGE (OUTPATIENT)
Dept: CARDIOLOGY | Facility: CLINIC | Age: 52
End: 2022-11-29

## 2022-12-23 ENCOUNTER — PATIENT MESSAGE (OUTPATIENT)
Dept: FAMILY MEDICINE | Facility: CLINIC | Age: 52
End: 2022-12-23

## 2023-05-29 ENCOUNTER — PATIENT OUTREACH (OUTPATIENT)
Dept: ADMINISTRATIVE | Facility: HOSPITAL | Age: 53
End: 2023-05-29

## 2023-05-29 NOTE — LETTER
May 29, 2023      Mary Mcrae MD             [facility]  [address]  [place] [address]  Phone: [phone]   Patient: Sabrina Collins   MR Number: [id number]   YOB: 1970         To whom it may concern       We are seeing Sabrina CollinsHANNAH.O.B is 1970, at [facility]. Mckinley Crawford MD is their primary care physician. To help with our health maintenance records could you please send the following:     Most recent Mammogram and Pap Smear Result.      Please send fax to [phone].    Thank-you in advance for your assistance. If you have any questions or concerns, please don't hesitate to contact me at [phone].     Sandy HOWELL LPN Care Coordination   [facility]  Phone [phone],  Fax [phone]

## 2023-05-29 NOTE — LETTER
May 29, 2023    Ken Villarreal MD             [facility]  [address]  [place] [address]  Phone: [phone]   Patient: Sabrina Collins   MR Number: [id number]   YOB: 1970            We are seeing Sabrina CollinsHANNAH.O.B is 1970, at [facility]. Mckinley Crawford MD is their primary care physician. To help with our health maintenance records could you please send the following:     Most recent Colonoscopy with Path Report     Repeat Colonoscopy in  _____ years                                             Please send fax to [phone].    Thank-you in advance for your assistance. If you have any questions or concerns, please don't hesitate to contact me at [phone].     Sincerely,  Sandy HOWELL LPN Care Coordination   [facility]   Phone [phone],  Fax [phone]

## 2023-05-29 NOTE — PROGRESS NOTES
Working HTN Report    LM requesting updated bp reading and offering to schedule annual exam.    Per  pt had colon completed in 2021 with Dr Villarreal.  Faxed request for copy of report.    Per  pt had a pap completed in 2021.  Faxed request for copy of pap and mammo.

## 2023-06-05 NOTE — PROGRESS NOTES
Manually Hyperlinked COLONOSCOPY from outside Facility. SBE with biopsy & pathology report received and uploaded to media.

## 2023-07-27 LAB — BCS RECOMMENDATION EXT: NORMAL

## 2023-07-31 ENCOUNTER — PATIENT OUTREACH (OUTPATIENT)
Dept: ADMINISTRATIVE | Facility: HOSPITAL | Age: 53
End: 2023-07-31

## 2023-08-30 NOTE — TELEPHONE ENCOUNTER
----- Message from Isiah Juan sent at 3/12/2018 10:01 AM CDT -----  The pt arrived at the [facility] for a CT exam needing to receive prior authorization clearance from her insurer.  The pt can be reached at [phone].   Simple: Patient demonstrates quick and easy understanding

## 2023-11-04 ENCOUNTER — PATIENT OUTREACH (OUTPATIENT)
Dept: ADMINISTRATIVE | Facility: HOSPITAL | Age: 53
End: 2023-11-04

## 2023-11-08 ENCOUNTER — PATIENT MESSAGE (OUTPATIENT)
Dept: ADMINISTRATIVE | Facility: HOSPITAL | Age: 53
End: 2023-11-08

## 2023-11-08 ENCOUNTER — PATIENT OUTREACH (OUTPATIENT)
Dept: ADMINISTRATIVE | Facility: HOSPITAL | Age: 53
End: 2023-11-08

## 2023-11-08 NOTE — PROGRESS NOTES
HTN Report: Reached out to patient in regards to blood pressure and appointment with pcp. No answer. Left voicemail for call back. Sent portal message as well.

## 2023-11-20 ENCOUNTER — PATIENT MESSAGE (OUTPATIENT)
Dept: FAMILY MEDICINE | Facility: CLINIC | Age: 53
End: 2023-11-20

## 2024-07-31 ENCOUNTER — TELEPHONE (OUTPATIENT)
Dept: FAMILY MEDICINE | Facility: CLINIC | Age: 54
End: 2024-07-31
Payer: COMMERCIAL

## 2024-07-31 ENCOUNTER — OFFICE VISIT (OUTPATIENT)
Dept: FAMILY MEDICINE | Facility: CLINIC | Age: 54
End: 2024-07-31
Payer: COMMERCIAL

## 2024-07-31 VITALS
TEMPERATURE: 99 F | OXYGEN SATURATION: 99 % | HEART RATE: 66 BPM | DIASTOLIC BLOOD PRESSURE: 77 MMHG | WEIGHT: 162.31 LBS | SYSTOLIC BLOOD PRESSURE: 105 MMHG | RESPIRATION RATE: 18 BRPM | BODY MASS INDEX: 24.6 KG/M2 | HEIGHT: 68 IN

## 2024-07-31 DIAGNOSIS — E78.00 PURE HYPERCHOLESTEROLEMIA: ICD-10-CM

## 2024-07-31 DIAGNOSIS — L98.9 SKIN LESION: ICD-10-CM

## 2024-07-31 DIAGNOSIS — G44.201 INTRACTABLE TENSION-TYPE HEADACHE, UNSPECIFIED CHRONICITY PATTERN: ICD-10-CM

## 2024-07-31 DIAGNOSIS — Z13.1 SCREENING FOR DIABETES MELLITUS: ICD-10-CM

## 2024-07-31 DIAGNOSIS — I10 PRIMARY HYPERTENSION: ICD-10-CM

## 2024-07-31 DIAGNOSIS — A60.9 HSV (HERPES SIMPLEX VIRUS) ANOGENITAL INFECTION: ICD-10-CM

## 2024-07-31 DIAGNOSIS — K21.9 GASTROESOPHAGEAL REFLUX DISEASE, UNSPECIFIED WHETHER ESOPHAGITIS PRESENT: ICD-10-CM

## 2024-07-31 DIAGNOSIS — Z00.00 ANNUAL PHYSICAL EXAM: Primary | ICD-10-CM

## 2024-07-31 DIAGNOSIS — L29.9 PRURITUS: ICD-10-CM

## 2024-07-31 DIAGNOSIS — F17.200 SMOKER: ICD-10-CM

## 2024-07-31 DIAGNOSIS — J45.20 MILD INTERMITTENT ASTHMA WITHOUT COMPLICATION: ICD-10-CM

## 2024-07-31 PROCEDURE — 1159F MED LIST DOCD IN RCRD: CPT | Mod: CPTII,S$GLB,, | Performed by: NURSE PRACTITIONER

## 2024-07-31 PROCEDURE — 1160F RVW MEDS BY RX/DR IN RCRD: CPT | Mod: CPTII,S$GLB,, | Performed by: NURSE PRACTITIONER

## 2024-07-31 PROCEDURE — 3078F DIAST BP <80 MM HG: CPT | Mod: CPTII,S$GLB,, | Performed by: NURSE PRACTITIONER

## 2024-07-31 PROCEDURE — 3008F BODY MASS INDEX DOCD: CPT | Mod: CPTII,S$GLB,, | Performed by: NURSE PRACTITIONER

## 2024-07-31 PROCEDURE — 99396 PREV VISIT EST AGE 40-64: CPT | Mod: S$GLB,,, | Performed by: NURSE PRACTITIONER

## 2024-07-31 PROCEDURE — 99999 PR PBB SHADOW E&M-EST. PATIENT-LVL IV: CPT | Mod: PBBFAC,,, | Performed by: NURSE PRACTITIONER

## 2024-07-31 PROCEDURE — 3074F SYST BP LT 130 MM HG: CPT | Mod: CPTII,S$GLB,, | Performed by: NURSE PRACTITIONER

## 2024-07-31 RX ORDER — PANTOPRAZOLE SODIUM 40 MG/1
40 TABLET, DELAYED RELEASE ORAL DAILY
Qty: 60 TABLET | Refills: 3 | Status: SHIPPED | OUTPATIENT
Start: 2024-07-31

## 2024-07-31 RX ORDER — ALBUTEROL SULFATE 90 UG/1
2 AEROSOL, METERED RESPIRATORY (INHALATION) EVERY 4 HOURS PRN
Qty: 18 G | Refills: 2 | Status: SHIPPED | OUTPATIENT
Start: 2024-07-31 | End: 2025-07-31

## 2024-07-31 RX ORDER — HYDROXYZINE HYDROCHLORIDE 25 MG/1
25 TABLET, FILM COATED ORAL 3 TIMES DAILY PRN
Qty: 30 TABLET | Refills: 5 | Status: SHIPPED | OUTPATIENT
Start: 2024-07-31

## 2024-07-31 RX ORDER — VALACYCLOVIR HYDROCHLORIDE 1 G/1
1000 TABLET, FILM COATED ORAL DAILY
Qty: 30 TABLET | Refills: 2 | Status: SHIPPED | OUTPATIENT
Start: 2024-07-31

## 2024-07-31 RX ORDER — TRIAMCINOLONE ACETONIDE 1 MG/G
OINTMENT TOPICAL 2 TIMES DAILY
Qty: 90 G | Refills: 2 | Status: SHIPPED | OUTPATIENT
Start: 2024-07-31

## 2024-07-31 RX ORDER — BUTALBITAL, ACETAMINOPHEN AND CAFFEINE 50; 325; 40 MG/1; MG/1; MG/1
1 TABLET ORAL
Qty: 45 TABLET | Refills: 2 | Status: SHIPPED | OUTPATIENT
Start: 2024-07-31

## 2024-07-31 RX ORDER — TRIAMCINOLONE ACETONIDE 1 MG/G
OINTMENT TOPICAL
COMMUNITY
Start: 2023-10-30 | End: 2024-07-31 | Stop reason: SDUPTHER

## 2024-07-31 RX ORDER — DICYCLOMINE HYDROCHLORIDE 10 MG/1
10 CAPSULE ORAL DAILY PRN
Qty: 30 CAPSULE | Refills: 2 | Status: SHIPPED | OUTPATIENT
Start: 2024-07-31

## 2024-07-31 RX ORDER — BACITRACIN 500 [USP'U]/G
OINTMENT TOPICAL 2 TIMES DAILY
COMMUNITY
Start: 2023-10-30

## 2024-07-31 RX ORDER — MUPIROCIN 20 MG/G
OINTMENT TOPICAL 3 TIMES DAILY
Qty: 44 G | Refills: 2 | Status: SHIPPED | OUTPATIENT
Start: 2024-07-31

## 2024-07-31 RX ORDER — CYCLOBENZAPRINE HCL 10 MG
10 TABLET ORAL DAILY PRN
Qty: 30 TABLET | Refills: 0 | Status: SHIPPED | OUTPATIENT
Start: 2024-07-31

## 2024-07-31 NOTE — PROGRESS NOTES
Subjective:       Patient ID: Sabrina Collins is a 54 y.o. female.    Chief Complaint: Annual Exam (Pt expresses no concerns at this time. Just needs med refills )    HPI    She comes in for routine annual wellness exam with fasting labs and medication refills     Problem List Items Addressed This Visit          Neuro    Intractable tension-type headache    Overview     -chronic headaches  -treated with fioricet on a PRN basis  -denies AE from medications  -symptoms are intermittent     The patient was checked in the [place] [employer]'s  Prescription Monitoring Program. There appears to be no incongruities with the patient's verbalized history.            Relevant Medications    butalbital-acetaminophen-caffeine -40 mg (FIORICET, ESGIC) -40 mg per tablet       Derm    Pruritus    Overview     -chronic allergic dermatitis   -symptoms include rashes, swelling, and itching  -uses hydroxyzine PRN with improvement in symptoms         Relevant Medications    hydrOXYzine HCL (ATARAX) 25 MG tablet       Pulmonary    Mild intermittent asthma without complication    Overview     The patient presents with asthma.  The patient denies chest pain, palpitations, shortness of breath, difficulty breathing, and wheezing.  The patient feels that the pattern of symptoms is stable.  Current medications used to help asthma include:   Your Quick Relief Medication: (7000h ago through 1000h from now)          Stop Sig     albuterol (VENTOLIN HFA) 90 mcg/actuation inhaler  Every 4 hours PRN        Sig: Inhale 2 puffs into the lungs every 4 (four) hours as needed for Shortness of Breath. Rescue       07/31/25 4139 Inhale 2 puffs into the lungs every 4 (four) hours as needed for Shortness of Breath. Rescue            Your Controller Medications: (7000h ago through 1000h from now)          Stop Sig     beclomethasone (QVAR) 40 mcg/actuation Aero  2 times daily        Sig: Inhale 1 puff into the lungs 2 (two) times  daily. Controller       -- Inhale 1 puff into the lungs 2 (two) times daily. Controller                    Relevant Medications    albuterol (VENTOLIN HFA) 90 mcg/actuation inhaler       Cardiac/Vascular    Primary hypertension    Overview     The patient presents with essential hypertension. Controlled with no medications at this time.  Counseling was offered regarding low salt diets.  The patient has a reduced salt intake.  The patient denies chest pain, palpitations, shortness of breath, dyspnea on exertion, left or murmur neck pain, nausea, vomiting, diaphoresis, paroxysmal nocturnal dyspnea, and orthopnea.   Hypertension Medications                            Relevant Orders    Lipid Panel    Hemoglobin A1C    Comprehensive Metabolic Panel    Pure hypercholesterolemia    Overview     The patient presents with hyperlipidemia.  She is controlled with diet.  The patient denies chest pain, neuropathy, and myalgias.  The patient has reduced fat intake and has been exercising.  Current treatment has included the medications listed in the med card.    Lab Results   Component Value Date    CHOL 243 (H) 11/09/2021    CHOL 224 (H) 03/30/2016    CHOL 201 (H) 10/28/2014       Lab Results   Component Value Date    HDL 60 11/09/2021    HDL 79 07/31/2020    HDL 79 07/17/2019       Lab Results   Component Value Date    LDLCALC 152.6 11/09/2021    LDLCALC 154 07/31/2020    LDLCALC 162 (A) 07/17/2019       Lab Results   Component Value Date    TRIG 152 (H) 11/09/2021    TRIG 123 07/31/2020    TRIG 124 07/17/2019       Lab Results   Component Value Date    CHOLHDL 24.7 11/09/2021    CHOLHDL 25.4 03/30/2016    CHOLHDL 25.9 10/28/2014     Lab Results   Component Value Date    ALT 13 11/09/2021    AST 24 11/09/2021    ALKPHOS 72 11/09/2021    BILITOT 0.3 11/09/2021                       ID    HSV (herpes simplex virus) anogenital infection    Overview     -hx of HSV  -intermittent rash  -treatment with Valtrex, improvement with  medications  -denies AE of meds           Relevant Medications    valACYclovir (VALTREX) 1000 MG tablet       GI    Gastroesophageal reflux disease    Overview     The patient presents with GERD.  Denies chest pain, nausea & vomiting, belching, cramping, distention, dyspepsia, dysphagia, hematochezia, melena, abdominal pain and weight loss.  Current treatment has included medications that are listed in medications list with significant response.  There has been no medicine intolerance.  The patient cannot identify any exacerbating factors.  Avoidance of NSAID's, ASA, carbonated beverages and spicy food was discussed.           Relevant Medications    pantoprazole (PROTONIX) 40 MG tablet       Other    Smoker    Overview     + history of smoking  Denies shortness of breath, hemoptysis, night sweats, fever          Other Visit Diagnoses       Annual physical exam    -  Primary    Relevant Orders    Lipid Panel    Hemoglobin A1C    Comprehensive Metabolic Panel    Skin lesion        Relevant Medications    mupirocin (BACTROBAN) 2 % ointment    Screening for diabetes mellitus        Relevant Orders    Hemoglobin A1C             Review of Systems   Constitutional:  Negative for activity change, fatigue, fever and unexpected weight change.   HENT:  Negative for ear pain, hearing loss, rhinorrhea, sore throat and trouble swallowing.    Eyes:  Negative for pain, discharge and visual disturbance.   Respiratory:  Negative for cough, chest tightness, shortness of breath and wheezing.    Cardiovascular:  Negative for chest pain and palpitations.   Gastrointestinal:  Positive for constipation and diarrhea. Negative for abdominal pain, blood in stool and vomiting.   Endocrine: Negative for polydipsia and polyuria.   Genitourinary:  Positive for hematuria. Negative for difficulty urinating, dysuria and menstrual problem.   Musculoskeletal:  Positive for neck pain. Negative for arthralgias, joint swelling and myalgias.   Skin:   Negative for color change and rash.   Neurological:  Positive for headaches. Negative for dizziness and weakness.   Psychiatric/Behavioral:  Negative for confusion, dysphoric mood and sleep disturbance. The patient is not nervous/anxious.        Vitals:    07/31/24 1018   BP: 105/77   Pulse: 66   Resp: 18   Temp: 99.1 °F (37.3 °C)       Objective:     Current Outpatient Medications   Medication Sig Dispense Refill    bacitracin 500 unit/gram ointment Apply topically 2 (two) times daily.      beclomethasone (QVAR) 40 mcg/actuation Aero Inhale 1 puff into the lungs 2 (two) times daily. Controller 8.7 g 0    blood pressure monitor Kit 1 kit by Misc.(Non-Drug; Combo Route) route once daily. 1 each 0    clobetasol (TEMOVATE) 0.05 % cream Apply topically 2 (two) times daily.      albuterol (VENTOLIN HFA) 90 mcg/actuation inhaler Inhale 2 puffs into the lungs every 4 (four) hours as needed for Shortness of Breath. Rescue 18 g 2    butalbital-acetaminophen-caffeine -40 mg (FIORICET, ESGIC) -40 mg per tablet Take 1 tablet by mouth every 4 to 6 hours as needed for Headaches. 45 tablet 2    cyclobenzaprine (FLEXERIL) 10 MG tablet Take 1 tablet (10 mg total) by mouth daily as needed for Muscle spasms. 30 tablet 0    dicyclomine (BENTYL) 10 MG capsule Take 1 capsule (10 mg total) by mouth daily as needed (stomach spasms). 30 capsule 2    hydrOXYzine HCL (ATARAX) 25 MG tablet Take 1 tablet (25 mg total) by mouth 3 (three) times daily as needed for Itching. 30 tablet 5    mupirocin (BACTROBAN) 2 % ointment Apply topically 3 (three) times daily. 44 g 2    pantoprazole (PROTONIX) 40 MG tablet Take 1 tablet (40 mg total) by mouth once daily. 60 tablet 3    triamcinolone acetonide 0.1% (KENALOG) 0.1 % ointment Apply topically 2 (two) times daily. 90 g 2    valACYclovir (VALTREX) 1000 MG tablet Take 1 tablet (1,000 mg total) by mouth once daily. Take 2 tablets q 12 hours at onset for two doses 30 tablet 2     No current  facility-administered medications for this visit.       Physical Exam  Vitals and nursing note reviewed.   Constitutional:       General: She is not in acute distress.     Appearance: She is well-developed.   HENT:      Head: Normocephalic and atraumatic.   Eyes:      Pupils: Pupils are equal, round, and reactive to light.   Neck:      Vascular: No carotid bruit.   Cardiovascular:      Rate and Rhythm: Normal rate and regular rhythm.   Pulmonary:      Effort: Pulmonary effort is normal.      Breath sounds: Normal breath sounds.   Musculoskeletal:         General: Normal range of motion.      Cervical back: Normal range of motion and neck supple.   Skin:     General: Skin is warm and dry.      Findings: No rash.   Neurological:      Mental Status: She is alert and oriented to person, place, and time.   Psychiatric:         Judgment: Judgment normal.         Assessment:       1. Annual physical exam    2. Primary hypertension    3. Pure hypercholesterolemia    4. HSV (herpes simplex virus) anogenital infection    5. Mild intermittent asthma without complication    6. Pruritus    7. Gastroesophageal reflux disease, unspecified whether esophagitis present    8. Intractable tension-type headache, unspecified chronicity pattern    9. Smoker    10. Skin lesion    11. Screening for diabetes mellitus        Plan:   Annual physical exam  -     Lipid Panel; Future; Expected date: 07/31/2024  -     Hemoglobin A1C; Future; Expected date: 07/31/2024  -     Comprehensive Metabolic Panel; Future; Expected date: 07/31/2024    Primary hypertension  -     Lipid Panel; Future; Expected date: 07/31/2024  -     Hemoglobin A1C; Future; Expected date: 07/31/2024  -     Comprehensive Metabolic Panel; Future; Expected date: 07/31/2024    Pure hypercholesterolemia  -continue healthy diet   Lipid panel    HSV (herpes simplex virus) anogenital infection  -     valACYclovir (VALTREX) 1000 MG tablet; Take 1 tablet (1,000 mg total) by mouth once  daily. Take 2 tablets q 12 hours at onset for two doses  Dispense: 30 tablet; Refill: 2    Mild intermittent asthma without complication  -     albuterol (VENTOLIN HFA) 90 mcg/actuation inhaler; Inhale 2 puffs into the lungs every 4 (four) hours as needed for Shortness of Breath. Rescue  Dispense: 18 g; Refill: 2    Pruritus  -     hydrOXYzine HCL (ATARAX) 25 MG tablet; Take 1 tablet (25 mg total) by mouth 3 (three) times daily as needed for Itching.  Dispense: 30 tablet; Refill: 5    Gastroesophageal reflux disease, unspecified whether esophagitis present  -     pantoprazole (PROTONIX) 40 MG tablet; Take 1 tablet (40 mg total) by mouth once daily.  Dispense: 60 tablet; Refill: 3    Intractable tension-type headache, unspecified chronicity pattern  -     butalbital-acetaminophen-caffeine -40 mg (FIORICET, ESGIC) -40 mg per tablet; Take 1 tablet by mouth every 4 to 6 hours as needed for Headaches.  Dispense: 45 tablet; Refill: 2    Smoker  Encourage smoking cessation  Monitor for symptoms of lung CA    Skin lesion  -     mupirocin (BACTROBAN) 2 % ointment; Apply topically 3 (three) times daily.  Dispense: 44 g; Refill: 2  -     triamcinolone acetonide 0.1% (KENALOG) 0.1 % ointment; Apply topically 2 (two) times daily.  Dispense: 90 g; Refill: 2  Screening for diabetes mellitus  -     Hemoglobin A1C; Future; Expected date: 07/31/2024    Other orders  -     dicyclomine (BENTYL) 10 MG capsule; Take 1 capsule (10 mg total) by mouth daily as needed (stomach spasms).  Dispense: 30 capsule; Refill: 2    -     cyclobenzaprine (FLEXERIL) 10 MG tablet; Take 1 tablet (10 mg total) by mouth daily as needed for Muscle spasms.  Dispense: 30 tablet; Refill: 0        No follow-ups on file.    There are no Patient Instructions on file for this visit.

## 2024-07-31 NOTE — TELEPHONE ENCOUNTER
----- Message from Dionne Johnson sent at 7/31/2024 10:56 AM CDT -----  Contact: Yola Aceves with [facility] is calling to speak with the nurse regarding valACYclovir (VALTREX) 1000 MG tablet and pantoprazole (PROTONIX) 40 MG tablet. Reports needing to clairfy directions on medications. Please give Yola a call back at [phone]

## 2024-08-01 ENCOUNTER — LAB VISIT (OUTPATIENT)
Dept: LAB | Facility: HOSPITAL | Age: 54
End: 2024-08-01
Attending: NURSE PRACTITIONER
Payer: COMMERCIAL

## 2024-08-01 DIAGNOSIS — I10 PRIMARY HYPERTENSION: ICD-10-CM

## 2024-08-01 DIAGNOSIS — Z13.1 SCREENING FOR DIABETES MELLITUS: ICD-10-CM

## 2024-08-01 DIAGNOSIS — Z00.00 ANNUAL PHYSICAL EXAM: ICD-10-CM

## 2024-08-01 LAB
ALBUMIN SERPL BCP-MCNC: 3.9 G/DL (ref 3.5–5.2)
ALP SERPL-CCNC: 59 U/L (ref 55–135)
ALT SERPL W/O P-5'-P-CCNC: 10 U/L (ref 10–44)
ANION GAP SERPL CALC-SCNC: 8 MMOL/L (ref 8–16)
AST SERPL-CCNC: 19 U/L (ref 10–40)
BILIRUB SERPL-MCNC: 0.3 MG/DL (ref 0.1–1)
BUN SERPL-MCNC: 12 MG/DL (ref 6–20)
CALCIUM SERPL-MCNC: 9.6 MG/DL (ref 8.7–10.5)
CHLORIDE SERPL-SCNC: 109 MMOL/L (ref 95–110)
CHOLEST SERPL-MCNC: 212 MG/DL (ref 120–199)
CHOLEST/HDLC SERPL: 4.1 {RATIO} (ref 2–5)
CO2 SERPL-SCNC: 25 MMOL/L (ref 23–29)
CREAT SERPL-MCNC: 0.9 MG/DL (ref 0.5–1.4)
EST. GFR  (NO RACE VARIABLE): >60 ML/MIN/1.73 M^2
ESTIMATED AVG GLUCOSE: 105 MG/DL (ref 68–131)
GLUCOSE SERPL-MCNC: 93 MG/DL (ref 70–110)
HBA1C MFR BLD: 5.3 % (ref 4–5.6)
HDLC SERPL-MCNC: 52 MG/DL (ref 40–75)
HDLC SERPL: 24.5 % (ref 20–50)
LDLC SERPL CALC-MCNC: 143.4 MG/DL (ref 63–159)
NONHDLC SERPL-MCNC: 160 MG/DL
POTASSIUM SERPL-SCNC: 4.4 MMOL/L (ref 3.5–5.1)
PROT SERPL-MCNC: 6.8 G/DL (ref 6–8.4)
SODIUM SERPL-SCNC: 142 MMOL/L (ref 136–145)
TRIGL SERPL-MCNC: 83 MG/DL (ref 30–150)

## 2024-08-01 PROCEDURE — 83036 HEMOGLOBIN GLYCOSYLATED A1C: CPT | Performed by: NURSE PRACTITIONER

## 2024-08-01 PROCEDURE — 80053 COMPREHEN METABOLIC PANEL: CPT | Performed by: NURSE PRACTITIONER

## 2024-08-01 PROCEDURE — 36415 COLL VENOUS BLD VENIPUNCTURE: CPT | Mod: PO | Performed by: NURSE PRACTITIONER

## 2024-08-01 PROCEDURE — 80061 LIPID PANEL: CPT | Performed by: NURSE PRACTITIONER

## 2024-09-11 ENCOUNTER — PATIENT MESSAGE (OUTPATIENT)
Dept: FAMILY MEDICINE | Facility: CLINIC | Age: 54
End: 2024-09-11
Payer: COMMERCIAL